# Patient Record
Sex: FEMALE | Race: WHITE | NOT HISPANIC OR LATINO | Employment: OTHER | URBAN - METROPOLITAN AREA
[De-identification: names, ages, dates, MRNs, and addresses within clinical notes are randomized per-mention and may not be internally consistent; named-entity substitution may affect disease eponyms.]

---

## 2017-12-30 ENCOUNTER — LAB REQUISITION (OUTPATIENT)
Dept: LAB | Facility: HOSPITAL | Age: 82
End: 2017-12-30

## 2017-12-30 DIAGNOSIS — Z00.00 ROUTINE GENERAL MEDICAL EXAMINATION AT A HEALTH CARE FACILITY: ICD-10-CM

## 2017-12-30 LAB
HCT VFR BLD AUTO: 27.2 % (ref 35.6–45.5)
HGB BLD-MCNC: 8.3 G/DL (ref 11.9–15.5)

## 2017-12-30 PROCEDURE — 85014 HEMATOCRIT: CPT

## 2017-12-30 PROCEDURE — 85018 HEMOGLOBIN: CPT

## 2018-01-02 ENCOUNTER — HOSPITAL ENCOUNTER (EMERGENCY)
Facility: HOSPITAL | Age: 83
Discharge: HOME | End: 2018-01-03
Attending: EMERGENCY MEDICINE | Admitting: EMERGENCY MEDICINE

## 2018-01-02 ENCOUNTER — APPOINTMENT (OUTPATIENT)
Dept: GENERAL RADIOLOGY | Facility: HOSPITAL | Age: 83
End: 2018-01-02

## 2018-01-02 VITALS
SYSTOLIC BLOOD PRESSURE: 157 MMHG | HEART RATE: 64 BPM | OXYGEN SATURATION: 93 % | WEIGHT: 120 LBS | DIASTOLIC BLOOD PRESSURE: 61 MMHG | RESPIRATION RATE: 18 BRPM | BODY MASS INDEX: 21.26 KG/M2 | HEIGHT: 63 IN | TEMPERATURE: 98.8 F

## 2018-01-02 DIAGNOSIS — I50.9 ACUTE ON CHRONIC CONGESTIVE HEART FAILURE, UNSPECIFIED CONGESTIVE HEART FAILURE TYPE: Primary | ICD-10-CM

## 2018-01-02 DIAGNOSIS — D64.9 CHRONIC ANEMIA: ICD-10-CM

## 2018-01-02 LAB
ALBUMIN SERPL-MCNC: 2.4 G/DL (ref 3.5–5.2)
ALBUMIN/GLOB SERPL: 0.5 G/DL
ALP SERPL-CCNC: 84 U/L (ref 39–117)
ALT SERPL W P-5'-P-CCNC: 5 U/L (ref 1–33)
ANION GAP SERPL CALCULATED.3IONS-SCNC: 10.2 MMOL/L
AST SERPL-CCNC: 15 U/L (ref 1–32)
BASOPHILS # BLD AUTO: 0.02 10*3/MM3 (ref 0–0.2)
BASOPHILS NFR BLD AUTO: 0.3 % (ref 0–1.5)
BILIRUB SERPL-MCNC: 0.2 MG/DL (ref 0.1–1.2)
BUN BLD-MCNC: 26 MG/DL (ref 8–23)
BUN/CREAT SERPL: 16.3 (ref 7–25)
CALCIUM SPEC-SCNC: 8.3 MG/DL (ref 8.2–9.6)
CHLORIDE SERPL-SCNC: 101 MMOL/L (ref 98–107)
CO2 SERPL-SCNC: 26.8 MMOL/L (ref 22–29)
CREAT BLD-MCNC: 1.6 MG/DL (ref 0.57–1)
DEPRECATED RDW RBC AUTO: 58.6 FL (ref 37–54)
EOSINOPHIL # BLD AUTO: 0.2 10*3/MM3 (ref 0–0.7)
EOSINOPHIL NFR BLD AUTO: 2.9 % (ref 0.3–6.2)
ERYTHROCYTE [DISTWIDTH] IN BLOOD BY AUTOMATED COUNT: 16.6 % (ref 11.7–13)
GFR SERPL CREATININE-BSD FRML MDRD: 30 ML/MIN/1.73
GLOBULIN UR ELPH-MCNC: 5.1 GM/DL
GLUCOSE BLD-MCNC: 87 MG/DL (ref 65–99)
HCT VFR BLD AUTO: 27.8 % (ref 35.6–45.5)
HGB BLD-MCNC: 8.4 G/DL (ref 11.9–15.5)
IMM GRANULOCYTES # BLD: 0.02 10*3/MM3 (ref 0–0.03)
IMM GRANULOCYTES NFR BLD: 0.3 % (ref 0–0.5)
LYMPHOCYTES # BLD AUTO: 1.63 10*3/MM3 (ref 0.9–4.8)
LYMPHOCYTES NFR BLD AUTO: 23.8 % (ref 19.6–45.3)
MCH RBC QN AUTO: 29 PG (ref 26.9–32)
MCHC RBC AUTO-ENTMCNC: 30.2 G/DL (ref 32.4–36.3)
MCV RBC AUTO: 95.9 FL (ref 80.5–98.2)
MONOCYTES # BLD AUTO: 1 10*3/MM3 (ref 0.2–1.2)
MONOCYTES NFR BLD AUTO: 14.6 % (ref 5–12)
NEUTROPHILS # BLD AUTO: 3.99 10*3/MM3 (ref 1.9–8.1)
NEUTROPHILS NFR BLD AUTO: 58.1 % (ref 42.7–76)
NT-PROBNP SERPL-MCNC: 2126 PG/ML (ref 0–1800)
PLATELET # BLD AUTO: 387 10*3/MM3 (ref 140–500)
PMV BLD AUTO: 10 FL (ref 6–12)
POTASSIUM BLD-SCNC: 4.5 MMOL/L (ref 3.5–5.2)
PROT SERPL-MCNC: 7.5 G/DL (ref 6–8.5)
RBC # BLD AUTO: 2.9 10*6/MM3 (ref 3.9–5.2)
SODIUM BLD-SCNC: 138 MMOL/L (ref 136–145)
TROPONIN T SERPL-MCNC: <0.01 NG/ML (ref 0–0.03)
WBC NRBC COR # BLD: 6.86 10*3/MM3 (ref 4.5–10.7)

## 2018-01-02 PROCEDURE — 93005 ELECTROCARDIOGRAM TRACING: CPT | Performed by: EMERGENCY MEDICINE

## 2018-01-02 PROCEDURE — 85025 COMPLETE CBC W/AUTO DIFF WBC: CPT | Performed by: EMERGENCY MEDICINE

## 2018-01-02 PROCEDURE — 99284 EMERGENCY DEPT VISIT MOD MDM: CPT

## 2018-01-02 PROCEDURE — 93010 ELECTROCARDIOGRAM REPORT: CPT | Performed by: INTERNAL MEDICINE

## 2018-01-02 PROCEDURE — 84484 ASSAY OF TROPONIN QUANT: CPT | Performed by: EMERGENCY MEDICINE

## 2018-01-02 PROCEDURE — 80053 COMPREHEN METABOLIC PANEL: CPT | Performed by: EMERGENCY MEDICINE

## 2018-01-02 PROCEDURE — 71046 X-RAY EXAM CHEST 2 VIEWS: CPT

## 2018-01-02 PROCEDURE — 83880 ASSAY OF NATRIURETIC PEPTIDE: CPT | Performed by: EMERGENCY MEDICINE

## 2018-01-02 PROCEDURE — 36415 COLL VENOUS BLD VENIPUNCTURE: CPT | Performed by: EMERGENCY MEDICINE

## 2018-01-03 ENCOUNTER — EPISODE CHANGES (OUTPATIENT)
Dept: CASE MANAGEMENT | Facility: OTHER | Age: 83
End: 2018-01-03

## 2018-01-03 NOTE — ED PROVIDER NOTES
EMERGENCY DEPARTMENT ENCOUNTER    CHIEF COMPLAINT  Chief Complaint: Abnormal Lab  History given by: Patient  History limited by: none  Room Number: 19/19  PMD: Ani Bentley MD      HPI:  Pt is a 89 y.o. female who presents to ED from NH due to elevated BNP. EMS states pt was wheezing, but pt denies SOA and chest pain. Pt confirms leg swelling, but is unsure how long that has been going on.     Duration:  Several hours  Onset: gradual  Timing: constant  Location: none  Radiation: none  Quality: elevated BNP  Intensity/Severity: mild  Progression: unchanged  Associated Symptoms: leg swelling  Aggravating Factors: none  Alleviating Factors: none  Previous Episodes: none  Treatment before arrival: none    PAST MEDICAL HISTORY  Active Ambulatory Problems     Diagnosis Date Noted   • Iron deficiency anemia due to chronic blood loss 06/10/2016   • CKD (chronic kidney disease) 06/10/2016   • Rectal ulcer 06/10/2016   • Anemia of chronic renal failure, stage 2 (mild) 07/21/2016     Resolved Ambulatory Problems     Diagnosis Date Noted   • No Resolved Ambulatory Problems     Past Medical History:   Diagnosis Date   • Anemia    • Anxiety    • Chronic kidney disease    • Dementia    • Hypertension    • Renal disorder        PAST SURGICAL HISTORY  History reviewed. No pertinent surgical history.    FAMILY HISTORY  History reviewed. No pertinent family history.    SOCIAL HISTORY  Social History     Social History   • Marital status:      Spouse name: N/A   • Number of children: N/A   • Years of education: N/A     Occupational History   • Not on file.     Social History Main Topics   • Smoking status: Never Smoker   • Smokeless tobacco: Never Used   • Alcohol use Not on file   • Drug use: Not on file   • Sexual activity: Not on file     Other Topics Concern   • Not on file     Social History Narrative   • No narrative on file       ALLERGIES  Niacin    REVIEW OF SYSTEMS  Review of Systems   Constitutional:  Negative for fever.   HENT: Negative for sore throat.    Eyes: Negative.    Respiratory: Negative for cough and shortness of breath.    Cardiovascular: Positive for leg swelling. Negative for chest pain.   Gastrointestinal: Negative for abdominal pain, diarrhea and vomiting.   Genitourinary: Negative for dysuria.   Musculoskeletal: Negative for neck pain.   Skin: Negative for rash.   Allergic/Immunologic: Negative.    Neurological: Negative for weakness, numbness and headaches.   Hematological: Negative.    Psychiatric/Behavioral: Negative.    All other systems reviewed and are negative.      PHYSICAL EXAM  ED Triage Vitals   Temp Heart Rate Resp BP SpO2   01/02/18 1834 01/02/18 1800 01/02/18 1800 01/02/18 1800 01/02/18 1800   98.7 °F (37.1 °C) 70 16 185/70 93 %      Temp src Heart Rate Source Patient Position BP Location FiO2 (%)   01/02/18 1834 01/02/18 2048 01/02/18 2048 01/02/18 2048 --   Tympanic Monitor Lying Left arm        Physical Exam   Constitutional: She is oriented to person, place, and time and well-developed, well-nourished, and in no distress. No distress.   HENT:   Head: Normocephalic and atraumatic.   Eyes: EOM are normal. Pupils are equal, round, and reactive to light.   Neck: Normal range of motion. Neck supple.   Cardiovascular: Normal rate, regular rhythm and normal heart sounds.    Pulmonary/Chest: Effort normal and breath sounds normal. No respiratory distress.   Abdominal: Soft. There is no tenderness. There is no rebound and no guarding.   Musculoskeletal: Normal range of motion. She exhibits edema (2+ pedal).   Neurological: She is alert and oriented to person, place, and time. She has normal sensation and normal strength.   Skin: Skin is warm and dry. No rash noted.   Psychiatric: Mood and affect normal.   Nursing note and vitals reviewed.      LAB RESULTS  Lab Results (last 24 hours)     Procedure Component Value Units Date/Time    CBC & Differential [[id number]] Collected:  01/02/18 1912     Specimen:  Blood Updated:  01/02/18 2007    Narrative:       The following orders were created for panel order CBC & Differential.  Procedure                               Abnormality         Status                     ---------                               -----------         ------                     CBC Auto Differential[[id number]]        Abnormal            Final result                 Please view results for these tests on the individual orders.    Comprehensive Metabolic Panel [[id number]]  (Abnormal) Collected:  01/02/18 1912    Specimen:  Blood Updated:  01/02/18 2003     Glucose 87 mg/dL      BUN 26 (H) mg/dL      Creatinine 1.60 (H) mg/dL      Sodium 138 mmol/L      Potassium 4.5 mmol/L      Chloride 101 mmol/L      CO2 26.8 mmol/L      Calcium 8.3 mg/dL      Total Protein 7.5 g/dL      Albumin 2.40 (L) g/dL      ALT (SGPT) 5 U/L      AST (SGOT) 15 U/L      Alkaline Phosphatase 84 U/L      Total Bilirubin 0.2 mg/dL      eGFR Non African Amer 30 (L) mL/min/1.73      Globulin 5.1 gm/dL      A/G Ratio 0.5 g/dL      BUN/Creatinine Ratio 16.3     Anion Gap 10.2 mmol/L     Narrative:       The MDRD GFR formula is only valid for adults with stable renal function between ages 18 and 70.    BNP [[id number]]  (Abnormal) Collected:  01/02/18 1912    Specimen:  Blood Updated:  01/02/18 1949     proBNP 2126.0 (H) pg/mL     Narrative:       Among patients with dyspnea, NT-proBNP is highly sensitive for the detection of acute congestive heart failure. In addition NT-proBNP of <300 pg/ml effectively rules out acute congestive heart failure with 99% negative predictive value.    Troponin [[id number]]  (Normal) Collected:  01/02/18 1912    Specimen:  Blood Updated:  01/02/18 1952     Troponin T <0.010 ng/mL     Narrative:       Troponin T Reference Ranges:  Less than 0.03 ng/mL:    Negative for AMI  0.03 to 0.09 ng/mL:      Indeterminant for AMI  Greater than 0.09 ng/mL: Positive for AMI    CBC Auto Differential  [[id number]]  (Abnormal) Collected:  01/02/18 1912    Specimen:  Blood Updated:  01/02/18 2007     WBC 6.86 10*3/mm3      RBC 2.90 (L) 10*6/mm3      Hemoglobin 8.4 (L) g/dL      Hematocrit 27.8 (L) %      MCV 95.9 fL      MCH 29.0 pg      MCHC 30.2 (L) g/dL      RDW 16.6 (H) %      RDW-SD 58.6 (H) fl      MPV 10.0 fL      Platelets 387 10*3/mm3      Neutrophil % 58.1 %      Lymphocyte % 23.8 %      Monocyte % 14.6 (H) %      Eosinophil % 2.9 %      Basophil % 0.3 %      Immature Grans % 0.3 %      Neutrophils, Absolute 3.99 10*3/mm3      Lymphocytes, Absolute 1.63 10*3/mm3      Monocytes, Absolute 1.00 10*3/mm3      Eosinophils, Absolute 0.20 10*3/mm3      Basophils, Absolute 0.02 10*3/mm3      Immature Grans, Absolute 0.02 10*3/mm3           I ordered the above labs and reviewed the results    RADIOLOGY  XR Chest 2 View   Final Result   Frontal projection of the chest demonstrates some pulmonary  vascular engorgement along with potentially mild interstitial pulmonary  edema. Areas of airspace opacification are seen within the left base  likely representative of foci of atelectasis and less likely infiltrate.  Lung volumes are relatively low. Cardiomediastinal silhouette  mild-to-moderately enlarged.         I ordered the above noted radiological studies. Interpreted by radiologist. Reviewed by me in PACS.       PROCEDURES  Procedures  EKG           EKG time: 2133  Rhythm/Rate: Copmplete Heart Block (artifact) 66  P waves and SD: irregular P waves, varying YANNA  QRS, axis: left axis deviation   ST and T waves: nonspecific T wave changes      Interpreted Contemporaneously by me, independently viewed  Changed compared to prior 8/22/10    EKG           EKG time: 2223  Rhythm/Rate: SR 63  P waves and SD: nml  QRS, axis: left axis deviation, small Q waves  ST and T waves: nonspecific T wave changes     Interpreted Contemporaneously by me, independently viewed  Changed compared to prior 2133     PROGRESS AND CONSULTS  ED  Course   Value Comment By Time   Hemoglobin: (!) 8.4 8.8 in 6/2017 Chino Wasserman MD 01/02 2109   Creatinine: (!) 1.60 1.41 ten months ago Chino Wasserman MD 01/02 2259     1840  CXR and labs ordered for further evaluation.     2235  Pt rechecked and resting comfortably. Discussed negative results of labs, EKGs, and CXR and plan to discharge due to lack of acute findings and unremarkable exam. Informed pt of plan to increase Lasix. Pt understands and agrees with plan, all questions addressed.   O2 sats: 92% on room air.       MEDICAL DECISION MAKING  Results were reviewed/discussed with the patient and they were also made aware of online access. Pt also made aware that some labs, such as cultures, will not be resulted during ER visit and follow up with PMD is necessary.     MDM  Number of Diagnoses or Management Options  Acute on chronic congestive heart failure, unspecified congestive heart failure type:   Chronic anemia:   Diagnosis management comments: Patient was sent to the ER from the nursing home with reports of an elevated BNP.  The patient had no complaints other than leg swelling.  Chest x-ray showed possible mildly increased vascular congestion.  Her proBNP was somewhat elevated.  Patient has chronic renal insufficiency.  Patient was breathing comfortably  Her room air O2 sats were 92%.  Patient does not require admission at this time.  Patient will be advised to increase her Lasix dose for the next several days.         Amount and/or Complexity of Data Reviewed  Clinical lab tests: ordered and reviewed (Troponin - negative  proBNP - 2126.0)  Tests in the radiology section of CPT®: ordered and reviewed (CXR - Frontal projection of the chest demonstrates some pulmonary  vascular engorgement along with potentially mild interstitial pulmonary  edema. Areas of airspace opacification are seen within the left base  likely representative of foci of atelectasis and less likely infiltrate.  Lung volumes are  relatively low. Cardiomediastinal silhouette  mild-to-moderately enlarged. )  Tests in the medicine section of CPT®: ordered and reviewed (See note)  Decide to obtain previous medical records or to obtain history from someone other than the patient: yes  Review and summarize past medical records: yes (12/13 - 12/16/17 - Admitted to [facility] for acute on chronic renal failure, UTI, and recent fall.   Creatinine 1.4 at discharge. )           DIAGNOSIS  Final diagnoses:   None       DISPOSITION  DISCHARGE    Patient discharged in stable condition.    Reviewed implications of results, diagnosis, meds, responsibility to follow up, warning signs and symptoms of possible worsening, potential complications and reasons to return to ER.    Patient/Family voiced understanding of above instructions.    Discussed plan for discharge, as there is no emergent indication for admission.  Pt/family is agreeable and understands need for follow up and repeat testing.  Pt is aware that discharge does not mean that nothing is wrong but it indicates no emergency is present that requires admission and they must continue care with follow-up as given below or physician of their choice.     FOLLOW-UP  No follow-up provider specified.       Medication List      Notice     No changes were made to your prescriptions during this visit.          Latest Documented Vital Signs:  As of 10:34 PM  BP- 169/68 HR- 62 Temp- 97.9 °F (36.6 °C) (Oral) O2 sat- 92%    --  Documentation assistance provided by alma Murray for Dr. Wasserman.  Information recorded by the scribe was done at my direction and has been verified and validated by me.              Casi Murray  01/02/18 9309       Chino Wasserman MD  01/02/18 2631

## 2018-01-03 NOTE — ED NOTES
"[facility] called for transport.  ETA \"within the hour\".     Swapna Trejo RN  01/02/18 0466    "

## 2018-01-03 NOTE — DISCHARGE INSTRUCTIONS
Take an extra 20 mg of Lasix daily for the next 4 days.  Return to emergency department for shortness of breath, chest pain, increased leg swelling, or other concern.  Follow-up with her primary care doctor in the next several days.

## 2018-01-22 ENCOUNTER — APPOINTMENT (OUTPATIENT)
Dept: GENERAL RADIOLOGY | Facility: HOSPITAL | Age: 83
End: 2018-01-22

## 2018-01-22 ENCOUNTER — APPOINTMENT (OUTPATIENT)
Dept: CT IMAGING | Facility: HOSPITAL | Age: 83
End: 2018-01-22

## 2018-01-22 ENCOUNTER — HOSPITAL ENCOUNTER (EMERGENCY)
Facility: HOSPITAL | Age: 83
Discharge: HOME | End: 2018-01-23
Attending: EMERGENCY MEDICINE | Admitting: EMERGENCY MEDICINE

## 2018-01-22 VITALS
OXYGEN SATURATION: 93 % | HEIGHT: 63 IN | DIASTOLIC BLOOD PRESSURE: 78 MMHG | BODY MASS INDEX: 19.49 KG/M2 | RESPIRATION RATE: 17 BRPM | WEIGHT: 110 LBS | TEMPERATURE: 97.9 F | HEART RATE: 72 BPM | SYSTOLIC BLOOD PRESSURE: 171 MMHG
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
## 2018-01-22 DIAGNOSIS — S06.0X0A CONCUSSION WITHOUT LOSS OF CONSCIOUSNESS, INITIAL ENCOUNTER: ICD-10-CM

## 2018-01-22 DIAGNOSIS — S16.1XXA STRAIN OF NECK MUSCLE, INITIAL ENCOUNTER: ICD-10-CM

## 2018-01-22 DIAGNOSIS — S50.02XA CONTUSION OF LEFT ELBOW, INITIAL ENCOUNTER: ICD-10-CM

## 2018-01-22 DIAGNOSIS — S00.83XA CONTUSION OF FACE, INITIAL ENCOUNTER: Primary | ICD-10-CM

## 2018-01-22 PROCEDURE — 72125 CT NECK SPINE W/O DYE: CPT

## 2018-01-22 PROCEDURE — 73070 X-RAY EXAM OF ELBOW: CPT

## 2018-01-22 PROCEDURE — 70450 CT HEAD/BRAIN W/O DYE: CPT

## 2018-01-22 PROCEDURE — 70486 CT MAXILLOFACIAL W/O DYE: CPT

## 2018-01-22 PROCEDURE — 99284 EMERGENCY DEPT VISIT MOD MDM: CPT

## 2018-01-22 RX ORDER — DONEPEZIL HYDROCHLORIDE 10 MG/1
10 TABLET, FILM COATED ORAL NIGHTLY
COMMUNITY
End: 2018-03-06 | Stop reason: HOSPADM

## 2018-01-22 RX ORDER — ESCITALOPRAM OXALATE 10 MG/1
10 TABLET ORAL DAILY
COMMUNITY
End: 2018-03-06 | Stop reason: HOSPADM

## 2018-01-22 RX ORDER — PANTOPRAZOLE SODIUM 20 MG/1
20 TABLET, DELAYED RELEASE ORAL DAILY
COMMUNITY
End: 2018-03-06 | Stop reason: HOSPADM

## 2018-01-22 RX ORDER — CHOLESTYRAMINE 4 G/9G
1 POWDER, FOR SUSPENSION ORAL
COMMUNITY
End: 2018-03-06 | Stop reason: HOSPADM

## 2018-01-22 RX ORDER — SULFAMETHOXAZOLE AND TRIMETHOPRIM 400; 80 MG/1; MG/1
1 TABLET ORAL 2 TIMES DAILY
COMMUNITY
End: 2018-03-06 | Stop reason: HOSPADM

## 2018-01-22 RX ORDER — VANCOMYCIN HYDROCHLORIDE 125 MG/1
125 CAPSULE ORAL 4 TIMES DAILY
COMMUNITY

## 2018-01-22 RX ORDER — FUROSEMIDE 20 MG/1
20 TABLET ORAL 2 TIMES DAILY
COMMUNITY
End: 2018-03-06 | Stop reason: HOSPADM

## 2018-01-22 RX ORDER — IPRATROPIUM BROMIDE AND ALBUTEROL SULFATE 2.5; .5 MG/3ML; MG/3ML
3 SOLUTION RESPIRATORY (INHALATION) EVERY 4 HOURS PRN
COMMUNITY

## 2018-01-22 RX ORDER — ACETAMINOPHEN 325 MG/1
650 TABLET ORAL EVERY 6 HOURS PRN
COMMUNITY

## 2018-01-22 RX ORDER — FERROUS SULFATE 325(65) MG
325 TABLET ORAL
COMMUNITY

## 2018-01-22 RX ORDER — TAMSULOSIN HYDROCHLORIDE 0.4 MG/1
1 CAPSULE ORAL NIGHTLY
COMMUNITY

## 2018-01-22 RX ORDER — PHENOL 1.4 %
600 AEROSOL, SPRAY (ML) MUCOUS MEMBRANE DAILY
COMMUNITY

## 2018-01-22 NOTE — ED TRIAGE NOTES
Pt was brought in by ems from [facility]  For unwitnessed falls. Pt has swelling left cheek and left forearm pain. Unknown loc. Denies pain. Bruising to left elbow and  Swelling to left cheek

## 2018-01-22 NOTE — ED PROVIDER NOTES
" EMERGENCY DEPARTMENT ENCOUNTER    CHIEF COMPLAINT  Chief Complaint: Fall  History given by: Patient  History limited by: Dementia  Room Number: 46/46  PMD: Ani Bentley MD      HPI:  Pt with h/o dementia presents to the ED c/o head injury s/p mechanical fall sustained earlier today in which pt slipped on a wet surface while pt was walking to the restroom. Pt denies LOC, abdominal pain, and chest pain, but reports that she has had mild neck pain. There are no other complaints at this time.     Pain Location: Head  Radiation: None  Quality: \"aching\"  Intensity/Severity: Moderate  Duration: Fall occurred earlier today  Onset quality: Fall was abrupt; pain was gradual  Timing: Pain is intermittent  Progression: Pain waxes and wanes  Aggravating Factors: Nothing  Alleviating Factors: Nothing  Previous Episodes: Unknown  Treatment before arrival: C-Collar applied to the neck  Associated Symptoms: Neck pain       PAST MEDICAL HISTORY  Active Ambulatory Problems     Diagnosis Date Noted   • Iron deficiency anemia due to chronic blood loss 06/10/2016   • CKD (chronic kidney disease) 06/10/2016   • Rectal ulcer 06/10/2016   • Anemia of chronic renal failure, stage 2 (mild) 07/21/2016     Resolved Ambulatory Problems     Diagnosis Date Noted   • No Resolved Ambulatory Problems     Past Medical History:   Diagnosis Date   • Anemia    • Anxiety    • Chronic kidney disease    • Coronary artery disease    • Dementia    • GERD (gastroesophageal reflux disease)    • Hypertension    • Renal disorder          PAST SURGICAL HISTORY  History reviewed. No pertinent surgical history.      FAMILY HISTORY  History reviewed. No pertinent family history.      SOCIAL HISTORY  Social History     Social History   • Marital status:      Spouse name: N/A   • Number of children: N/A   • Years of education: N/A     Occupational History   • Not on file.     Social History Main Topics   • Smoking status: Never Smoker   • Smokeless " tobacco: Never Used   • Alcohol use No   • Drug use: No   • Sexual activity: Not on file     Other Topics Concern   • Not on file     Social History Narrative   • No narrative on file         ALLERGIES  Niacin        REVIEW OF SYSTEMS  Review of Systems   Unable to perform ROS: Dementia   Cardiovascular: Negative for chest pain.   Gastrointestinal: Negative for abdominal pain.   Musculoskeletal: Positive for neck pain.        Head injury   Neurological: Negative for syncope.            PHYSICAL EXAM  ED Triage Vitals   Temp Heart Rate Resp BP SpO2   01/22/18 1519 01/22/18 1509 01/22/18 1509 01/22/18 1509 01/22/18 1509   97.9 °F (36.6 °C) 111 16 195/88 96 %      Temp src Heart Rate Source Patient Position BP Location FiO2 (%)   01/22/18 1519 01/22/18 1509 01/22/18 1846 01/22/18 1846 --   Oral Monitor Lying Right arm        Physical Exam   Constitutional: No distress.   HENT:   Right Ear: Tympanic membrane normal.   Left Ear: Tympanic membrane normal.   Mouth/Throat: Mucous membranes are normal.   There is swelling to the left periorbital area and left cheek.      Eyes: EOM are normal. Pupils are equal, round, and reactive to light.   Neck: Normal range of motion. Neck supple.   Cardiovascular: Normal rate, regular rhythm and normal heart sounds.    Pulmonary/Chest: Effort normal and breath sounds normal. No respiratory distress. She has no decreased breath sounds. She has no wheezes. She has no rhonchi. She has no rales. She exhibits no tenderness.   Abdominal: Soft. There is no tenderness. There is no rebound and no guarding.   Musculoskeletal: She exhibits tenderness (to the left elbow (however, pt is able to extend it fully)).   C-Spine, T-Spine, and L-Spine are nontender. Right elbow is nontender.    Neurological: She is alert. She has normal sensation.   Pt answers some questions appropriately (pt has dementia).    Skin: Skin is warm.   There is a small skin tear to the lateral aspect of the left leg.     Psychiatric: Mood and affect normal.   Nursing note and vitals reviewed.          RADIOLOGY  CT Cervical Spine Without Contrast   Preliminary Result   No convincing fracture is identified. Extensive multilevel   degenerative disease involving the cervical spine is noted. There is   widening of the interspinous space, however at C6-7. This is age   indeterminate and may be a function of prior trauma. Acute injury cannot   be excluded. A calcific focus medial to the superior articulating facet   of C7 on the left is appreciated. Donor site is not identified. This may   represent ligamentum flavum calcification or potentially be related to a   remote injury. Further evaluation could be performed with a MRI   examination of the cervical spine. The above information was called to   and discussed with Dr. Zambrano.       CHECK CHECK           Radiation dose reduction techniques were utilized, including automated   exposure control and exposure modulation based on body size.      Interpreted by radiologist. Independently viewed by me.         CT Facial Bones Without Contrast   Preliminary Result   No convincing fracture is identified. Extensive multilevel   degenerative disease involving the cervical spine is noted. There is   widening of the interspinous space, however at C6-7. This is age   indeterminate and may be a function of prior trauma. Acute injury cannot   be excluded. A calcific focus medial to the superior articulating facet   of C7 on the left is appreciated. Donor site is not identified. This may   represent ligamentum flavum calcification or potentially be related to a   remote injury. Further evaluation could be performed with a MRI   examination of the cervical spine. The above information was called to   and discussed with Dr. Zambrano.       CHECK CHECK           Radiation dose reduction techniques were utilized, including automated   exposure control and exposure modulation based on body size.       Interpreted by radiologist. Independently viewed by me.         CT Head Without Contrast   Preliminary Result   No evidence of fracture or of intracranial hemorrhage.       Radiation dose reduction techniques were utilized, including automated   exposure control and exposure modulation based on body size.        Interpreted by radiologist. Independently viewed by me.         XR Elbow 2 View Left   Final Result       FINDINGS: No joint effusion, fracture or dislocation. There is a  suggestion of soft tissue swelling noted about the lateral humeral  epicondyle. No soft tissue gas nor radiopaque foreign body seen. Joint  width's preserved. The remainder is unremarkable.     CONCLUSION: No acute osseous abnormality.    Interpreted by radiologist. Independently viewed by me.                Ordered the above noted radiological studies. Reviewed by me in PACS.       PROCEDURES  Procedures        PROGRESS AND CONSULTS  ED Course     6:57 PM:  Pt's CT Head, CT C-Spine, left elbow X-Ray, and CT Facial Bones are all negative acute. Pt will be discharged.     Pt's C-Collar has been removed prior to pt's discharge from the ER.         MEDICAL DECISION MAKING      MDM  Number of Diagnoses or Management Options     Amount and/or Complexity of Data Reviewed  Tests in the radiology section of CPT®: ordered and reviewed (CT Head: No evidence of fracture or of intracranial hemorrhage.)    Patient Progress  Patient progress: stable             DIAGNOSIS  Final diagnoses:   Contusion of face, initial encounter   Strain of neck muscle, initial encounter   Contusion of left elbow, initial encounter   Concussion without loss of consciousness, initial encounter   Fall, initial encounter         DISPOSITION  Pt discharged.    DISCHARGE    Patient discharged in stable condition.    Reviewed implications of results, diagnosis, meds, responsibility to follow up, warning signs and symptoms of possible worsening, potential complications and  reasons to return to ER.    Patient/Family voiced understanding of above instructions.    Discussed plan for discharge, as there is no emergent indication for admission.  Pt/family is agreeable and understands need for follow up and repeat testing.  Pt is aware that discharge does not mean that nothing is wrong but it indicates no emergency is present that requires admission and they must continue care with follow-up as given below or physician of their choice.     FOLLOW-UP  Ani Bentley MD  [address] [address]  [phone]    Schedule an appointment as soon as possible for a visit           Medication List      Changed          escitalopram 10 MG tablet   Commonly known as:  LEXAPRO   What changed:  Another medication with the same name was removed. Continue   taking this medication, and follow the directions you see here.       furosemide 20 MG tablet   Commonly known as:  LASIX   What changed:  Another medication with the same name was removed. Continue   taking this medication, and follow the directions you see here.       pantoprazole 20 MG EC tablet   Commonly known as:  PROTONIX   What changed:  Another medication with the same name was removed. Continue   taking this medication, and follow the directions you see here.                       Latest Documented Vital Signs:  As of 6:51 PM  BP- 171/78 HR- 76 Temp- 97.9 °F (36.6 °C) (Oral) O2 sat- 94%        --  Documentation assistance provided by alma Dumont for Dr. Lima MD.  Information recorded by the scribe was done at my direction and has been verified and validated by me.       Ruma Dumont  01/22/18 0784       Siva Amato MD  01/22/18 7306

## 2018-01-23 NOTE — ED NOTES
Spoke with Rachael about sending patient back to [facility] via [facility].       Gatito Reyes RN  01/22/18 1924

## 2018-01-23 NOTE — ED NOTES
RN spoke with Lakisha HALL at [facility] about sending the patient back to the facility.           Gatito Reyes RN  01/22/18 1926

## 2018-01-23 NOTE — DISCHARGE INSTRUCTIONS
Continue home medications and ice your face and elbow as needed.  Keep abrasions clean and dry.  Please return to the ED if symptoms worsen with vomiting or increasing pain.

## 2018-03-01 ENCOUNTER — HOSPITAL ENCOUNTER (INPATIENT)
Facility: HOSPITAL | Age: 83
LOS: 4 days | Discharge: NURSING, REHAB OR LONG-TERM CARE | End: 2018-03-06
Attending: EMERGENCY MEDICINE | Admitting: HOSPITALIST

## 2018-03-01 DIAGNOSIS — N17.9 ACUTE KIDNEY INJURY (HCC): ICD-10-CM

## 2018-03-01 DIAGNOSIS — N39.0 URINARY TRACT INFECTION IN ELDERLY PATIENT: Primary | ICD-10-CM

## 2018-03-01 LAB
BASOPHILS # BLD AUTO: 0.02 10*3/MM3 (ref 0–0.2)
BASOPHILS NFR BLD AUTO: 0.3 % (ref 0–1.5)
DEPRECATED RDW RBC AUTO: 59.2 FL (ref 37–54)
EOSINOPHIL # BLD AUTO: 0.26 10*3/MM3 (ref 0–0.7)
EOSINOPHIL NFR BLD AUTO: 3.6 % (ref 0.3–6.2)
ERYTHROCYTE [DISTWIDTH] IN BLOOD BY AUTOMATED COUNT: 16 % (ref 11.7–13)
HCT VFR BLD AUTO: 32.3 % (ref 35.6–45.5)
HGB BLD-MCNC: 9.8 G/DL (ref 11.9–15.5)
IMM GRANULOCYTES # BLD: 0 10*3/MM3 (ref 0–0.03)
IMM GRANULOCYTES NFR BLD: 0 % (ref 0–0.5)
LYMPHOCYTES # BLD AUTO: 2.03 10*3/MM3 (ref 0.9–4.8)
LYMPHOCYTES NFR BLD AUTO: 27.7 % (ref 19.6–45.3)
MAGNESIUM SERPL-MCNC: 2.1 MG/DL (ref 1.7–2.3)
MCH RBC QN AUTO: 30.5 PG (ref 26.9–32)
MCHC RBC AUTO-ENTMCNC: 30.3 G/DL (ref 32.4–36.3)
MCV RBC AUTO: 100.6 FL (ref 80.5–98.2)
MONOCYTES # BLD AUTO: 0.82 10*3/MM3 (ref 0.2–1.2)
MONOCYTES NFR BLD AUTO: 11.2 % (ref 5–12)
NEUTROPHILS # BLD AUTO: 4.19 10*3/MM3 (ref 1.9–8.1)
NEUTROPHILS NFR BLD AUTO: 57.2 % (ref 42.7–76)
PLATELET # BLD AUTO: 297 10*3/MM3 (ref 140–500)
PMV BLD AUTO: 10.5 FL (ref 6–12)
RBC # BLD AUTO: 3.21 10*6/MM3 (ref 3.9–5.2)
WBC NRBC COR # BLD: 7.32 10*3/MM3 (ref 4.5–10.7)

## 2018-03-01 PROCEDURE — 80053 COMPREHEN METABOLIC PANEL: CPT | Performed by: PHYSICIAN ASSISTANT

## 2018-03-01 PROCEDURE — 87086 URINE CULTURE/COLONY COUNT: CPT | Performed by: PHYSICIAN ASSISTANT

## 2018-03-01 PROCEDURE — 94799 UNLISTED PULMONARY SVC/PX: CPT

## 2018-03-01 PROCEDURE — 81001 URINALYSIS AUTO W/SCOPE: CPT | Performed by: PHYSICIAN ASSISTANT

## 2018-03-01 PROCEDURE — 99285 EMERGENCY DEPT VISIT HI MDM: CPT

## 2018-03-01 PROCEDURE — 94640 AIRWAY INHALATION TREATMENT: CPT

## 2018-03-01 PROCEDURE — 83735 ASSAY OF MAGNESIUM: CPT | Performed by: PHYSICIAN ASSISTANT

## 2018-03-01 PROCEDURE — 85025 COMPLETE CBC W/AUTO DIFF WBC: CPT | Performed by: PHYSICIAN ASSISTANT

## 2018-03-01 PROCEDURE — 87186 SC STD MICRODIL/AGAR DIL: CPT | Performed by: PHYSICIAN ASSISTANT

## 2018-03-01 RX ORDER — IPRATROPIUM BROMIDE AND ALBUTEROL SULFATE 2.5; .5 MG/3ML; MG/3ML
3 SOLUTION RESPIRATORY (INHALATION) ONCE
Status: COMPLETED | OUTPATIENT
Start: 2018-03-01 | End: 2018-03-01

## 2018-03-01 RX ORDER — SODIUM CHLORIDE 0.9 % (FLUSH) 0.9 %
10 SYRINGE (ML) INJECTION AS NEEDED
Status: DISCONTINUED | OUTPATIENT
Start: 2018-03-01 | End: 2018-03-06 | Stop reason: HOSPADM

## 2018-03-01 RX ADMIN — IPRATROPIUM BROMIDE AND ALBUTEROL SULFATE 3 ML: .5; 3 SOLUTION RESPIRATORY (INHALATION) at 23:43

## 2018-03-01 RX ADMIN — SODIUM CHLORIDE 500 ML: 9 INJECTION, SOLUTION INTRAVENOUS at 22:55

## 2018-03-02 PROBLEM — N39.0 URINARY TRACT INFECTION IN ELDERLY PATIENT: Status: ACTIVE | Noted: 2018-03-02

## 2018-03-02 PROBLEM — F03.90 DEMENTIA (HCC): Status: ACTIVE | Noted: 2018-03-02

## 2018-03-02 PROBLEM — N18.30 CKD (CHRONIC KIDNEY DISEASE), STAGE III (HCC): Status: ACTIVE | Noted: 2018-03-02

## 2018-03-02 PROBLEM — D53.9 MACROCYTIC ANEMIA: Status: ACTIVE | Noted: 2018-03-02

## 2018-03-02 PROBLEM — E86.0 DEHYDRATION: Status: ACTIVE | Noted: 2018-03-02

## 2018-03-02 PROBLEM — D64.89 ANEMIA DUE TO MULTIPLE MECHANISMS: Status: ACTIVE | Noted: 2018-03-02

## 2018-03-02 PROBLEM — K52.9 CHRONIC DIARRHEA: Status: ACTIVE | Noted: 2018-03-02

## 2018-03-02 LAB
ALBUMIN SERPL-MCNC: 2.7 G/DL (ref 3.5–5.2)
ALBUMIN/GLOB SERPL: 0.6 G/DL
ALP SERPL-CCNC: 110 U/L (ref 39–117)
ALT SERPL W P-5'-P-CCNC: 14 U/L (ref 1–33)
ANION GAP SERPL CALCULATED.3IONS-SCNC: 8.7 MMOL/L
ANION GAP SERPL CALCULATED.3IONS-SCNC: 8.9 MMOL/L
ANION GAP SERPL CALCULATED.3IONS-SCNC: 9.6 MMOL/L
AST SERPL-CCNC: 17 U/L (ref 1–32)
BACTERIA UR QL AUTO: ABNORMAL /HPF
BILIRUB SERPL-MCNC: <0.2 MG/DL (ref 0.1–1.2)
BILIRUB UR QL STRIP: NEGATIVE
BUN BLD-MCNC: 47 MG/DL (ref 8–23)
BUN BLD-MCNC: 50 MG/DL (ref 8–23)
BUN BLD-MCNC: 57 MG/DL (ref 8–23)
BUN/CREAT SERPL: 32.6 (ref 7–25)
BUN/CREAT SERPL: 34.8 (ref 7–25)
BUN/CREAT SERPL: 35 (ref 7–25)
C DIFF TOX GENS STL QL NAA+PROBE: POSITIVE
CALCIUM SPEC-SCNC: 8.5 MG/DL (ref 8.2–9.6)
CALCIUM SPEC-SCNC: 8.6 MG/DL (ref 8.2–9.6)
CALCIUM SPEC-SCNC: 8.6 MG/DL (ref 8.2–9.6)
CHLORIDE SERPL-SCNC: 106 MMOL/L (ref 98–107)
CHLORIDE SERPL-SCNC: 107 MMOL/L (ref 98–107)
CHLORIDE SERPL-SCNC: 107 MMOL/L (ref 98–107)
CLARITY UR: ABNORMAL
CO2 SERPL-SCNC: 24.1 MMOL/L (ref 22–29)
CO2 SERPL-SCNC: 24.4 MMOL/L (ref 22–29)
CO2 SERPL-SCNC: 26.3 MMOL/L (ref 22–29)
COLOR UR: ABNORMAL
CREAT BLD-MCNC: 1.43 MG/DL (ref 0.57–1)
CREAT BLD-MCNC: 1.44 MG/DL (ref 0.57–1)
CREAT BLD-MCNC: 1.64 MG/DL (ref 0.57–1)
DEPRECATED RDW RBC AUTO: 57.9 FL (ref 37–54)
ERYTHROCYTE [DISTWIDTH] IN BLOOD BY AUTOMATED COUNT: 16 % (ref 11.7–13)
FOLATE SERPL-MCNC: 4.02 NG/ML (ref 4.78–24.2)
GFR SERPL CREATININE-BSD FRML MDRD: 29 ML/MIN/1.73
GFR SERPL CREATININE-BSD FRML MDRD: 34 ML/MIN/1.73
GFR SERPL CREATININE-BSD FRML MDRD: 34 ML/MIN/1.73
GLOBULIN UR ELPH-MCNC: 4.3 GM/DL
GLUCOSE BLD-MCNC: 92 MG/DL (ref 65–99)
GLUCOSE BLD-MCNC: 93 MG/DL (ref 65–99)
GLUCOSE BLD-MCNC: 95 MG/DL (ref 65–99)
GLUCOSE UR STRIP-MCNC: NEGATIVE MG/DL
HCT VFR BLD AUTO: 31.8 % (ref 35.6–45.5)
HGB BLD-MCNC: 9.5 G/DL (ref 11.9–15.5)
HGB UR QL STRIP.AUTO: ABNORMAL
HYALINE CASTS UR QL AUTO: ABNORMAL /LPF
IRON 24H UR-MRATE: 25 MCG/DL (ref 37–145)
IRON SATN MFR SERPL: 11 % (ref 20–50)
KETONES UR QL STRIP: ABNORMAL
LACTOFERRIN STL QL LA: POSITIVE
LEUKOCYTE ESTERASE UR QL STRIP.AUTO: ABNORMAL
MAGNESIUM SERPL-MCNC: 2.1 MG/DL (ref 1.7–2.3)
MCH RBC QN AUTO: 29.7 PG (ref 26.9–32)
MCHC RBC AUTO-ENTMCNC: 29.9 G/DL (ref 32.4–36.3)
MCV RBC AUTO: 99.4 FL (ref 80.5–98.2)
NITRITE UR QL STRIP: POSITIVE
PH UR STRIP.AUTO: 6 [PH] (ref 5–8)
PHOSPHATE SERPL-MCNC: 2.7 MG/DL (ref 2.5–4.5)
PLATELET # BLD AUTO: 271 10*3/MM3 (ref 140–500)
PMV BLD AUTO: 10.2 FL (ref 6–12)
POTASSIUM BLD-SCNC: 4.7 MMOL/L (ref 3.5–5.2)
POTASSIUM BLD-SCNC: 4.8 MMOL/L (ref 3.5–5.2)
POTASSIUM BLD-SCNC: 4.8 MMOL/L (ref 3.5–5.2)
PROT SERPL-MCNC: 7 G/DL (ref 6–8.5)
PROT UR QL STRIP: ABNORMAL
RBC # BLD AUTO: 3.2 10*6/MM3 (ref 3.9–5.2)
RBC # UR: ABNORMAL /HPF
REF LAB TEST METHOD: ABNORMAL
RETICS/RBC NFR AUTO: 1.12 % (ref 0.5–1.5)
SODIUM BLD-SCNC: 140 MMOL/L (ref 136–145)
SODIUM BLD-SCNC: 141 MMOL/L (ref 136–145)
SODIUM BLD-SCNC: 141 MMOL/L (ref 136–145)
SP GR UR STRIP: 1.01 (ref 1–1.03)
SQUAMOUS #/AREA URNS HPF: ABNORMAL /HPF
TIBC SERPL-MCNC: 232 MCG/DL (ref 298–536)
TRANSFERRIN SERPL-MCNC: 156 MG/DL (ref 200–360)
TSH SERPL DL<=0.05 MIU/L-ACNC: 2.19 MIU/ML (ref 0.27–4.2)
UROBILINOGEN UR QL STRIP: ABNORMAL
VIT B12 BLD-MCNC: 404 PG/ML (ref 211–946)
WBC NRBC COR # BLD: 7.55 10*3/MM3 (ref 4.5–10.7)
WBC UR QL AUTO: ABNORMAL /HPF

## 2018-03-02 PROCEDURE — 83631 LACTOFERRIN FECAL (QUANT): CPT | Performed by: HOSPITALIST

## 2018-03-02 PROCEDURE — 83540 ASSAY OF IRON: CPT | Performed by: HOSPITALIST

## 2018-03-02 PROCEDURE — 25010000002 ENOXAPARIN PER 10 MG: Performed by: HOSPITALIST

## 2018-03-02 PROCEDURE — 84466 ASSAY OF TRANSFERRIN: CPT | Performed by: HOSPITALIST

## 2018-03-02 PROCEDURE — 84100 ASSAY OF PHOSPHORUS: CPT | Performed by: HOSPITALIST

## 2018-03-02 PROCEDURE — 80048 BASIC METABOLIC PNL TOTAL CA: CPT | Performed by: HOSPITALIST

## 2018-03-02 PROCEDURE — 84443 ASSAY THYROID STIM HORMONE: CPT | Performed by: HOSPITALIST

## 2018-03-02 PROCEDURE — 83735 ASSAY OF MAGNESIUM: CPT | Performed by: HOSPITALIST

## 2018-03-02 PROCEDURE — 25010000002 CEFTRIAXONE PER 250 MG: Performed by: EMERGENCY MEDICINE

## 2018-03-02 PROCEDURE — 82607 VITAMIN B-12: CPT | Performed by: HOSPITALIST

## 2018-03-02 PROCEDURE — 82746 ASSAY OF FOLIC ACID SERUM: CPT | Performed by: HOSPITALIST

## 2018-03-02 PROCEDURE — 85045 AUTOMATED RETICULOCYTE COUNT: CPT | Performed by: HOSPITALIST

## 2018-03-02 PROCEDURE — 87493 C DIFF AMPLIFIED PROBE: CPT | Performed by: HOSPITALIST

## 2018-03-02 PROCEDURE — 85027 COMPLETE CBC AUTOMATED: CPT | Performed by: HOSPITALIST

## 2018-03-02 RX ORDER — FERROUS SULFATE 325(65) MG
325 TABLET ORAL
Status: DISCONTINUED | OUTPATIENT
Start: 2018-03-02 | End: 2018-03-03

## 2018-03-02 RX ORDER — CEFTRIAXONE SODIUM 1 G/50ML
1 INJECTION, SOLUTION INTRAVENOUS EVERY 24 HOURS
Status: COMPLETED | OUTPATIENT
Start: 2018-03-03 | End: 2018-03-05

## 2018-03-02 RX ORDER — IPRATROPIUM BROMIDE AND ALBUTEROL SULFATE 2.5; .5 MG/3ML; MG/3ML
3 SOLUTION RESPIRATORY (INHALATION) EVERY 4 HOURS PRN
Status: DISCONTINUED | OUTPATIENT
Start: 2018-03-02 | End: 2018-03-06 | Stop reason: HOSPADM

## 2018-03-02 RX ORDER — DONEPEZIL HYDROCHLORIDE 10 MG/1
10 TABLET, FILM COATED ORAL NIGHTLY
Status: DISCONTINUED | OUTPATIENT
Start: 2018-03-02 | End: 2018-03-03

## 2018-03-02 RX ORDER — ONDANSETRON 4 MG/1
4 TABLET, FILM COATED ORAL EVERY 6 HOURS PRN
Status: DISCONTINUED | OUTPATIENT
Start: 2018-03-02 | End: 2018-03-06 | Stop reason: HOSPADM

## 2018-03-02 RX ORDER — ACETAMINOPHEN 325 MG/1
650 TABLET ORAL EVERY 4 HOURS PRN
Status: DISCONTINUED | OUTPATIENT
Start: 2018-03-02 | End: 2018-03-06 | Stop reason: HOSPADM

## 2018-03-02 RX ORDER — SODIUM CHLORIDE 0.9 % (FLUSH) 0.9 %
1-10 SYRINGE (ML) INJECTION AS NEEDED
Status: DISCONTINUED | OUTPATIENT
Start: 2018-03-02 | End: 2018-03-06 | Stop reason: HOSPADM

## 2018-03-02 RX ORDER — SODIUM CHLORIDE 9 MG/ML
100 INJECTION, SOLUTION INTRAVENOUS CONTINUOUS
Status: DISCONTINUED | OUTPATIENT
Start: 2018-03-02 | End: 2018-03-02

## 2018-03-02 RX ORDER — MIRTAZAPINE 15 MG/1
15 TABLET, FILM COATED ORAL NIGHTLY
Status: DISCONTINUED | OUTPATIENT
Start: 2018-03-02 | End: 2018-03-06 | Stop reason: HOSPADM

## 2018-03-02 RX ORDER — PANTOPRAZOLE SODIUM 40 MG/1
40 TABLET, DELAYED RELEASE ORAL EVERY MORNING
Status: DISCONTINUED | OUTPATIENT
Start: 2018-03-02 | End: 2018-03-03

## 2018-03-02 RX ORDER — UREA 10 %
1250 LOTION (ML) TOPICAL DAILY
Status: DISCONTINUED | OUTPATIENT
Start: 2018-03-02 | End: 2018-03-02 | Stop reason: CLARIF

## 2018-03-02 RX ORDER — ESCITALOPRAM OXALATE 10 MG/1
10 TABLET ORAL DAILY
Status: DISCONTINUED | OUTPATIENT
Start: 2018-03-02 | End: 2018-03-06 | Stop reason: HOSPADM

## 2018-03-02 RX ORDER — CEFTRIAXONE SODIUM 1 G/50ML
1 INJECTION, SOLUTION INTRAVENOUS ONCE
Status: COMPLETED | OUTPATIENT
Start: 2018-03-02 | End: 2018-03-02

## 2018-03-02 RX ORDER — SODIUM CHLORIDE 9 MG/ML
75 INJECTION, SOLUTION INTRAVENOUS CONTINUOUS
Status: DISCONTINUED | OUTPATIENT
Start: 2018-03-02 | End: 2018-03-03

## 2018-03-02 RX ORDER — ONDANSETRON 2 MG/ML
4 INJECTION INTRAMUSCULAR; INTRAVENOUS EVERY 6 HOURS PRN
Status: DISCONTINUED | OUTPATIENT
Start: 2018-03-02 | End: 2018-03-06 | Stop reason: HOSPADM

## 2018-03-02 RX ORDER — TAMSULOSIN HYDROCHLORIDE 0.4 MG/1
0.4 CAPSULE ORAL NIGHTLY
Status: DISCONTINUED | OUTPATIENT
Start: 2018-03-02 | End: 2018-03-06 | Stop reason: HOSPADM

## 2018-03-02 RX ORDER — PANTOPRAZOLE SODIUM 20 MG/1
20 TABLET, DELAYED RELEASE ORAL DAILY
Status: DISCONTINUED | OUTPATIENT
Start: 2018-03-02 | End: 2018-03-02 | Stop reason: CLARIF

## 2018-03-02 RX ORDER — CHOLESTYRAMINE 4 G/9G
1 POWDER, FOR SUSPENSION ORAL DAILY
Status: DISCONTINUED | OUTPATIENT
Start: 2018-03-02 | End: 2018-03-06 | Stop reason: HOSPADM

## 2018-03-02 RX ORDER — LOPERAMIDE HYDROCHLORIDE 2 MG/1
2 CAPSULE ORAL 4 TIMES DAILY PRN
Status: DISCONTINUED | OUTPATIENT
Start: 2018-03-02 | End: 2018-03-06 | Stop reason: HOSPADM

## 2018-03-02 RX ORDER — ONDANSETRON 4 MG/1
4 TABLET, ORALLY DISINTEGRATING ORAL EVERY 6 HOURS PRN
Status: DISCONTINUED | OUTPATIENT
Start: 2018-03-02 | End: 2018-03-06 | Stop reason: HOSPADM

## 2018-03-02 RX ORDER — CALCIUM CARBONATE 500(1250)
1000 TABLET ORAL DAILY
Status: DISCONTINUED | OUTPATIENT
Start: 2018-03-02 | End: 2018-03-06 | Stop reason: HOSPADM

## 2018-03-02 RX ADMIN — TAMSULOSIN HYDROCHLORIDE 0.4 MG: 0.4 CAPSULE ORAL at 20:54

## 2018-03-02 RX ADMIN — ENOXAPARIN SODIUM 30 MG: 30 INJECTION SUBCUTANEOUS at 08:04

## 2018-03-02 RX ADMIN — PANTOPRAZOLE SODIUM 40 MG: 40 TABLET, DELAYED RELEASE ORAL at 06:20

## 2018-03-02 RX ADMIN — SODIUM CHLORIDE 100 ML/HR: 9 INJECTION, SOLUTION INTRAVENOUS at 00:39

## 2018-03-02 RX ADMIN — VANCOMYCIN 125 MG: KIT at 17:11

## 2018-03-02 RX ADMIN — ESCITALOPRAM 10 MG: 10 TABLET, FILM COATED ORAL at 08:04

## 2018-03-02 RX ADMIN — MIRTAZAPINE 15 MG: 15 TABLET, FILM COATED ORAL at 20:55

## 2018-03-02 RX ADMIN — SODIUM CHLORIDE 100 ML/HR: 9 INJECTION, SOLUTION INTRAVENOUS at 07:46

## 2018-03-02 RX ADMIN — DONEPEZIL HYDROCHLORIDE 10 MG: 10 TABLET, FILM COATED ORAL at 20:55

## 2018-03-02 RX ADMIN — CEFTRIAXONE SODIUM 1 G: 1 INJECTION, SOLUTION INTRAVENOUS at 00:39

## 2018-03-02 RX ADMIN — FERROUS SULFATE TAB 325 MG (65 MG ELEMENTAL FE) 325 MG: 325 (65 FE) TAB at 08:04

## 2018-03-02 RX ADMIN — CHOLESTYRAMINE 1 PACKET: 4 POWDER, FOR SUSPENSION ORAL at 10:24

## 2018-03-02 NOTE — H&P
"HISTORY AND PHYSICAL   [facility]        Patient Identification:  Name: Yelitza Hernandez  Age: 89 y.o.  Sex: female  :  1925  MRN: [id number]                     Primary Care Physician: Ani Bentley MD    Chief Complaint:  \"I just don't feel good\".    History of Present Illness:   Pleasant 89-year-old female, nursing home resident with given history of dementia.  She can really give no details as to why she is here.  She is reported to have been transferred here when routine labs showed a markedly elevated BUN level.  She does have a history of chronic kidney disease stage III.  She also has a history of chronic diarrhea although no details as to the etiology or available.  She cannot give any insight to this.  It is noted that vancomycin is on her medication list but there is no mention of C. difficile colitis.  There are some redundancies on her medication list also some not sure how accurate this is over that this is a current medication or a passed medication.  Patient can give no details as to whether the diarrhea has been under control recently or worsened.  She gives no details as to what her oral intake has been.    Patient gives no history of urinary symptomatology but is reported that on catheterization very soupy, gray thick urine was present.    There is also history of chronic anemia with both anemia chronic disease as well as iron deficiency and a given history of being intolerant to oral iron supplements.        Past Medical History:  Past Medical History:   Diagnosis Date   • Anemia     Iron deficiency    • Anxiety    • Chronic kidney disease    • Coronary artery disease    • Dementia    • GERD (gastroesophageal reflux disease)    • Hypertension    • Renal disorder     stage 3     Past Surgical History:  History reviewed. No pertinent surgical history.   Home Meds:    (Not in a hospital admission)    Allergies:  Allergies   Allergen Reactions   • Niacin  "     Immunizations:    There is no immunization history on file for this patient.  Social History:   Social History     Social History Narrative     Social History   Substance Use Topics   • Smoking status: Never Smoker   • Smokeless tobacco: Never Used   • Alcohol use No     Family History:  History reviewed. No pertinent family history.     Review of Systems  Review of Systems   Unable to perform ROS: Dementia       Objective:  tMax 24 hrs: Temp (24hrs), Av.2 °F (36.2 °C), Min:97.2 °F (36.2 °C), Max:97.2 °F (36.2 °C)    Vitals Ranges:   Temp:  [97.2 °F (36.2 °C)] 97.2 °F (36.2 °C)  Heart Rate:  [55-60] 58  Resp:  [16-20] 16  BP: (119-164)/(47-80) 136/47      Exam:  Physical Exam   Constitutional: She appears well-developed. No distress.   Thin and frail appearing.   HENT:   Head: Normocephalic and atraumatic.   Right Ear: External ear normal.   Left Ear: External ear normal.   Nose: Nose normal.   Mucous membranes appear dry.   Eyes: Conjunctivae and EOM are normal. Right eye exhibits no discharge. Left eye exhibits no discharge. No scleral icterus.   Neck: Neck supple. No JVD present. No tracheal deviation present. No thyromegaly present.   Cardiovascular: Normal rate, regular rhythm and normal heart sounds.    Pedal pulses 1+ bilaterally.  Upper extremity pulses just shy 2+ bilaterally.   Pulmonary/Chest: Effort normal and breath sounds normal. No stridor. No respiratory distress. She exhibits no tenderness.   Abdominal: Soft. Bowel sounds are normal. She exhibits no distension and no mass. There is no tenderness. There is no rebound and no guarding.   Musculoskeletal: She exhibits no edema or deformity.   Neurological: She is alert.   Oriented person.  She is aware she is in a hospital.  She knew it was 2018 but no further details on the date.   Skin: Skin is warm and dry. She is not diaphoretic.   Psychiatric: She has a normal mood and affect. Her behavior is normal. Judgment and thought content normal.        Data Review:  All labs and radiology reviewed.    Assessment:  Principal Problem:    Dehydration:  IV hydration with normal saline.  Encourage oral intake.  Patient also appears malnourished now asked nutrition to evaluate for this.  Active Problems:    Urinary tract infection in elderly patient:  Patient unable to relay symptomatology.    Chronic diarrhea:  No available records as to previous workup.  Uncertain whether vancomycin is at present or past medication.  As patient is receiving IV antibiotics are going ahead and check for C. Difficile...    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  I'll have the iron levels rechecked in the morning if they still remain very low she may benefit from IV iron sucrose.    Macrocytic anemia:  A new finding.  I will check B12, folate etc. in the morning.    Dementia      Plan:  Please see above.    Addendum: It appears oral vancomycin was a past medication.  There is concerns for possible previous history C. difficile colitis.  We will want minimize her antibiotic exposure.    Portillo Smith MD  3/2/2018  2:13 AM    EMR Dragon/Transcription disclaimer:   Much of this encounter note is an electronic transcription/translation of spoken language to printed text. The electronic translation of spoken language may permit erroneous, or at times, nonsensical words or phrases to be inadvertently transcribed; Although I have reviewed the note for such errors, some may still exist.

## 2018-03-02 NOTE — ED PROVIDER NOTES
EMERGENCY DEPARTMENT ENCOUNTER    CHIEF COMPLAINT  Chief Complaint: Abnormal labs  History given by: patient, NH  History limited by: h/o dementia  Room Number: 44/44  PMD: Ani Bentley MD      HPI:  Pt is a 89 y.o. female with a h/o CKD who presents from [facility] complaining of elevated BUN (72) and creatinine (2.0) that were drawn today at the NH. Patient has chronic diarrhea,but fever, chills, CP, SOA, or any other sx.     Duration:  gradual  Onset: earlier today  Location: General  Radiation: none  Quality: abnormal labs  Intensity/Severity: moderate  Progression: constant  Associated Symptoms: not stated  Aggravating Factors: not stated  Alleviating Factors: none  Previous Episodes: none  Treatment before arrival: sent from NH    PAST MEDICAL HISTORY  Active Ambulatory Problems     Diagnosis Date Noted   • Iron deficiency anemia due to chronic blood loss 06/10/2016   • CKD (chronic kidney disease) 06/10/2016   • Rectal ulcer 06/10/2016   • Anemia of chronic renal failure, stage 2 (mild) 07/21/2016     Resolved Ambulatory Problems     Diagnosis Date Noted   • No Resolved Ambulatory Problems     Past Medical History:   Diagnosis Date   • Anemia    • Anxiety    • Chronic kidney disease    • Coronary artery disease    • Dementia    • GERD (gastroesophageal reflux disease)    • Hypertension    • Renal disorder        PAST SURGICAL HISTORY  History reviewed. No pertinent surgical history.    FAMILY HISTORY  History reviewed. No pertinent family history.    SOCIAL HISTORY  Social History     Social History   • Marital status:      Spouse name: N/A   • Number of children: N/A   • Years of education: N/A     Occupational History   • Not on file.     Social History Main Topics   • Smoking status: Never Smoker   • Smokeless tobacco: Never Used   • Alcohol use No   • Drug use: No   • Sexual activity: Not on file     Other Topics Concern   • Not on file     Social History Narrative        ALLERGIES  Niacin    REVIEW OF SYSTEMS  Review of Systems   Unable to perform ROS: Dementia       PHYSICAL EXAM  ED Triage Vitals   Temp Heart Rate Resp BP SpO2   03/01/18 2205 03/01/18 2205 03/01/18 2205 03/01/18 2205 03/01/18 2205   97.2 °F (36.2 °C) 60 18 164/66 97 %      Temp src Heart Rate Source Patient Position BP Location FiO2 (%)   03/01/18 2205 03/01/18 2205 03/01/18 2205 03/01/18 2205 --   Tympanic Monitor Sitting Right arm        Physical Exam   Constitutional: She is well-developed, well-nourished, and in no distress. No distress.   HENT:   Head: Normocephalic and atraumatic.   Mouth/Throat: Mucous membranes are dry.   Eyes: EOM are normal. Pupils are equal, round, and reactive to light.   Neck: Normal range of motion. Neck supple.   Cardiovascular: Normal rate, regular rhythm and normal heart sounds.    Pulmonary/Chest: Effort normal and breath sounds normal. No respiratory distress.   Abdominal: Soft. There is no tenderness. There is no rebound and no guarding.   Musculoskeletal: Normal range of motion. She exhibits no edema.   Neurological: She is alert. She has normal sensation and normal strength.   Skin: Skin is warm and dry. No rash noted.   Psychiatric: Mood and affect normal.   Nursing note and vitals reviewed.      LAB RESULTS  Lab Results (last 24 hours)     Procedure Component Value Units Date/Time    CBC & Differential [[id number]] Collected:  03/01/18 2255    Specimen:  Blood Updated:  03/01/18 5958    Narrative:       The following orders were created for panel order CBC & Differential.  Procedure                               Abnormality         Status                     ---------                               -----------         ------                     CBC Auto Differential[[id number]]        Abnormal            Final result                 Please view results for these tests on the individual orders.    Comprehensive Metabolic Panel [[id number]]  (Abnormal) Collected:  03/01/18  2255    Specimen:  Blood Updated:  03/02/18 0003     Glucose 95 mg/dL      BUN 57 (H) mg/dL      Creatinine 1.64 (H) mg/dL      Sodium 141 mmol/L      Potassium 4.8 mmol/L      Chloride 106 mmol/L      CO2 26.3 mmol/L      Calcium 8.6 mg/dL      Total Protein 7.0 g/dL      Albumin 2.70 (L) g/dL      ALT (SGPT) 14 U/L      AST (SGOT) 17 U/L      Alkaline Phosphatase 110 U/L      Total Bilirubin <0.2 mg/dL      eGFR Non African Amer 29 (L) mL/min/1.73      Globulin 4.3 gm/dL      A/G Ratio 0.6 g/dL      BUN/Creatinine Ratio 34.8 (H)     Anion Gap 8.7 mmol/L     Narrative:       The MDRD GFR formula is only valid for adults with stable renal function between ages 18 and 70.    Magnesium [[id number]]  (Normal) Collected:  03/01/18 2255    Specimen:  Blood Updated:  03/01/18 2359     Magnesium 2.1 mg/dL     CBC Auto Differential [[id number]]  (Abnormal) Collected:  03/01/18 2255    Specimen:  Blood Updated:  03/01/18 2338     WBC 7.32 10*3/mm3      RBC 3.21 (L) 10*6/mm3      Hemoglobin 9.8 (L) g/dL      Hematocrit 32.3 (L) %      .6 (H) fL      MCH 30.5 pg      MCHC 30.3 (L) g/dL      RDW 16.0 (H) %      RDW-SD 59.2 (H) fl      MPV 10.5 fL      Platelets 297 10*3/mm3      Neutrophil % 57.2 %      Lymphocyte % 27.7 %      Monocyte % 11.2 %      Eosinophil % 3.6 %      Basophil % 0.3 %      Immature Grans % 0.0 %      Neutrophils, Absolute 4.19 10*3/mm3      Lymphocytes, Absolute 2.03 10*3/mm3      Monocytes, Absolute 0.82 10*3/mm3      Eosinophils, Absolute 0.26 10*3/mm3      Basophils, Absolute 0.02 10*3/mm3      Immature Grans, Absolute 0.00 10*3/mm3     Urinalysis With / Culture If Indicated - Urine, Clean Catch [[id number]]  (Abnormal) Collected:  03/01/18 2328    Specimen:  Urine from Urine, Catheter Updated:  03/02/18 0001     Color, UA Other (A)     Appearance, UA Cloudy (A)     pH, UA 6.0     Specific Gravity, UA 1.011     Glucose, UA Negative     Ketones, UA Trace (A)     Bilirubin, UA Negative     Blood, UA  Large (3+) (A)     Protein,  mg/dL (2+) (A)     Leuk Esterase, UA Large (3+) (A)     Nitrite, UA Positive (A)     Urobilinogen, UA 0.2 E.U./dL    Urinalysis, Microscopic Only - Urine, Clean Catch [[id number]]  (Abnormal) Collected:  03/01/18 2328    Specimen:  Urine from Urine, Catheter Updated:  03/02/18 0014     RBC, UA  /HPF      Unable to determine due to loaded field (A)     WBC, UA Too Numerous to Count (A) /HPF      Bacteria, UA  /HPF      Unable to determine due to loaded field (A)     Squamous Epithelial Cells, UA  /HPF      Unable to determine due to loaded field (A)     Hyaline Casts, UA  /LPF      Unable to determine due to loaded field     Methodology Automated Microscopy    Urine Culture - Urine, Urine, Clean Catch [[id number]] Collected:  03/01/18 2328    Specimen:  Urine from Urine, Catheter Updated:  03/01/18 0232          I ordered the above labs and reviewed the results    PROCEDURES  Procedures      PROGRESS AND CONSULTS  ED Course     1250: Discussed case with Dr. Smith, [facility] hospitalist concerning the patient and the findings in the ED. Dr. Smith requests admittance to tele.       MEDICAL DECISION MAKING  Results were reviewed/discussed with the patient and they were also made aware of online access. Pt also made aware that some labs, such as cultures, will not be resulted during ER visit and follow up with PMD is necessary.     MDM       DIAGNOSIS  Final diagnoses:   Urinary tract infection in elderly patient   Acute kidney injury       DISPOSITION  ADMISSION    Discussed treatment plan and reason for admission with pt/family and admitting physician.  Pt/family voiced understanding of the plan for admission for further testing/treatment as needed.     Latest Documented Vital Signs:  As of 1:53 AM  BP- 136/47 HR- 58 Temp- 97.2 °F (36.2 °C) (Tympanic) O2 sat- 93%    --  Documentation assistance provided by alma Ace for MARYAN Raygoza.  Information recorded by the alma was  done at my direction and has been verified and validated by me.     Reshma Ace  03/02/18 0051       MARYAN Evans  03/02/18 0153

## 2018-03-02 NOTE — PLAN OF CARE
Problem: Nutrition, Imbalanced: Inadequate Oral Intake (Adult)  Intervention: Promote/Optimize Nutrition   03/02/18 1051   Nutrition Interventions   Oral Nutrition Promotion (Offer Ensure compact three times per day)

## 2018-03-02 NOTE — PROGRESS NOTES
Discharge Planning Assessment  [facility]     Patient Name: Yelitza Hernandez  MRN: [id number]  Today's Date: 3/2/2018    Admit Date: 3/1/2018          Discharge Needs Assessment       03/02/18 1337    Living Environment    Lives With facility resident    Living Arrangements other (see comments)   Lives long term care at [facility]    Home Accessibility no concerns    Stair Railings at Home none    Type of Financial/Environmental Concern none    Transportation Available ambulance    Living Environment    Provides Primary Care For no one, unable/limited ability to care for self    Able to Return to Prior Living Arrangements yes    Discharge Needs Assessment    Concerns To Be Addressed no discharge needs identified;denies needs/concerns at this time    Readmission Within The Last 30 Days no previous admission in last 30 days    Equipment Currently Used at Home wheelchair;hospital bed    Equipment Needed After Discharge none    Discharge Facility/Level Of Care Needs nursing facility, intermediate            Discharge Plan       03/02/18 1313    Case Management/Social Work Plan    Plan [facility] / Has Medicaid bed hold and can return anytime    Additional Comments I spoke with Casi with [facility] ([phone]), she said pt is from long term care bed and has a Medicaid bed hold and can return anytime.  HIPPA compliant voice mail left for pt's son Haroon Hernandez ([phone]), I requested a return call to confirm the plan at discharge is for pt to return to  [facility], I ask also if he is POA for pt that he bring a copy to the hospital to be scanned into pt's chart.  Transfer packet in pt's chart.               Penny Fountain RN          Discharge Placement     Facility/Agency Request Status Selected? Address Phone Number [facility]ax Number    [facility]     [address] [address] [phone]                 Demographic Summary       03/02/18 1336    Referral Information    Admission Type inpatient    Arrived From admitted as an inpatient;long-term care;nursing facility    Referral Source admission list    Record Reviewed medical record    Contact Information    Permission Granted to Share Information With family/designee;facility             Functional Status       03/02/18 1336    Functional Status Current    Ambulation 3-->assistive equipment and person    Transferring 3-->assistive equipment and person    Toileting 3-->assistive equipment and person    Bathing 3-->assistive equipment and person    Dressing 3-->assistive equipment and person    Eating 0-->independent    Communication 0-->understands/communicates without difficulty    Swallowing (if score 2 or more for any item, consult Rehab Services) 0-->swallows foods/liquids without difficulty    Change in Functional Status Since Onset of Current Illness/Injury no    IADL    IADL Comments --   Pt lives long term care at [facility]            Psychosocial     None            Abuse/Neglect     None            Legal       03/02/18 1340    Legal    Legal Considerations patient/family ability to make health care decisions;patient/ for healthcare needs;patient/family capacity to make sound judgments    Legal Comments --   I left voice mail for pt's son Haroon Hernandez and ask if he is POA for his mother that he bring copy to hospital to be scanned into pt's chart            Substance Abuse     None            Patient Forms       03/02/18 1339    Patient Forms    Provider Choice List --   Lives long term care at [facility].          Penny Fountain RN

## 2018-03-02 NOTE — PROGRESS NOTES
Malnutrition Severity Assessment    Patient Name:  Yelitza Hernandez  YOB: 1925  MRN: [id number]  Admit Date:  3/1/2018    Patient meets criteria for : Mild malnutrition    Comments:  Meets criteria for mild social/environmental circumstances malnutrition based on weight info, thin frail appearance,fat and muscles losses detailed below, report of poor intake/appetite, and skin breakdown.     Malnutrition Type: Social/Environmental Circumstance Malnutrition     Malnutrition Type (last 8 hours)      Malnutrition Severity Assessment       03/02/18 1041    Malnutrition Severity Assessment    Malnutrition Type Social/Environmental Circumstance Malnutrition      03/02/18 1041    Physical Signs of Malnutrition (Social/Environmental)    Muscle Wasting Mild   slight depression temporalis, slightly protruding clavicle    Fat Loss Mild   some depth pinch upper arm    Secondary Physical Signs Present (comment)   skin breakdown coccyx, heels      03/02/18 1041    Weight Status (Social/Environmental)    %UBW Mild (86-90%)      03/02/18 1041    Energy Intake Status (Social/Environmental)    Energy Intake Mild (<75% / 5d)   per patient history, somewhat limited due to dementia      03/02/18 1041    Criteria Met (Must meet criteria for severity in at least 2 of these categories: M Wasting, Fat Loss, Fluid, Secondary Signs, Wt. Status, Intake)    Patient meets criteria for  Mild malnutrition          Electronically signed by:  Anyi Aragon RD  03/02/18 10:58 AM

## 2018-03-02 NOTE — PLAN OF CARE
Problem: Patient Care Overview (Adult)  Goal: Plan of Care Review  Outcome: Ongoing (interventions implemented as appropriate)   03/02/18 0625   Coping/Psychosocial Response Interventions   Plan Of Care Reviewed With patient   Patient Care Overview   Progress no change   Outcome Evaluation   Outcome Summary/Follow up Plan VSS. IVF's infusing. pressure ulcers cleaned and covered with mepilex. Q2 turn. currently resting well     Goal: Adult Individualization and Mutuality  Outcome: Ongoing (interventions implemented as appropriate)    Goal: Discharge Needs Assessment  Outcome: Ongoing (interventions implemented as appropriate)      Problem: Fall Risk (Adult)  Goal: Identify Related Risk Factors and Signs and Symptoms  Outcome: Ongoing (interventions implemented as appropriate)    Goal: Absence of Falls  Outcome: Ongoing (interventions implemented as appropriate)      Problem: Skin Integrity Impairment, Risk/Actual (Adult)  Goal: Identify Related Risk Factors and Signs and Symptoms  Outcome: Ongoing (interventions implemented as appropriate)    Goal: Skin Integrity/Wound Healing  Outcome: Ongoing (interventions implemented as appropriate)      Problem: Infection, Risk/Actual (Adult)  Goal: Identify Related Risk Factors and Signs and Symptoms  Outcome: Outcome(s) achieved Date Met: 03/02/18    Goal: Infection Prevention/Resolution  Outcome: Ongoing (interventions implemented as appropriate)

## 2018-03-02 NOTE — PLAN OF CARE
Problem: Nutrition, Imbalanced: Inadequate Oral Intake (Adult)  Goal: Prevent Further Weight Loss  Outcome: Ongoing (interventions implemented as appropriate)

## 2018-03-02 NOTE — PLAN OF CARE
Problem: Patient Care Overview (Adult)  Goal: Adult Individualization and Mutuality  Outcome: Ongoing (interventions implemented as appropriate)    Goal: Discharge Needs Assessment  Outcome: Ongoing (interventions implemented as appropriate)      Problem: Fall Risk (Adult)  Goal: Identify Related Risk Factors and Signs and Symptoms  Outcome: Ongoing (interventions implemented as appropriate)    Goal: Absence of Falls  Outcome: Ongoing (interventions implemented as appropriate)      Problem: Skin Integrity Impairment, Risk/Actual (Adult)  Goal: Identify Related Risk Factors and Signs and Symptoms  Outcome: Ongoing (interventions implemented as appropriate)    Goal: Skin Integrity/Wound Healing  Outcome: Ongoing (interventions implemented as appropriate)      Problem: Infection, Risk/Actual (Adult)  Goal: Infection Prevention/Resolution  Outcome: Ongoing (interventions implemented as appropriate)      Problem: Nutrition, Imbalanced: Inadequate Oral Intake (Adult)  Goal: Identify Related Risk Factors and Signs and Symptoms  Outcome: Ongoing (interventions implemented as appropriate)    Goal: Improved Oral Intake  Outcome: Ongoing (interventions implemented as appropriate)

## 2018-03-02 NOTE — PLAN OF CARE
Problem: Patient Care Overview (Adult)  Goal: Plan of Care Review   03/02/18 5427   Outcome Evaluation   Outcome Summary/Follow up Plan VSS. Initiated and maintained contact spore isolation. IVF's discontinued, oral vancomycin started to treat C.diff. Wound nurse came to see patient today, cleaned and covered wounds with mepilex. Heel protector boots placed. Q2 turn. Pt is eating and drinking fluids well and independently. Pt is currently resting well at 03/02/18 1812.

## 2018-03-02 NOTE — CONSULTS
"Adult Nutrition  Assessment/PES    Patient Name:  Yelitza Hernandez  YOB: 1925  MRN: [id number]  Admit Date:  3/1/2018    Assessment Date:  3/2/2018    Comments:  Assessment completed and patient visited, nutrition focused exam completed on upper body as patient was complaining of being cold.  The patient reports poor appetite and states she has lost quite a bit of weight, appears thin and frail. Reports UBW of 140#.  Last available weight is 135# from 6/2016.  Completed MSA for mild social environmental malnutrition.  Patient is agreeable to Ensure. Will send three times a day and encourage intake. Follow for intake.           Reason for Assessment       03/02/18 1034    Reason for Assessment    Reason For Assessment/Visit admission assessment    Diagnosis Diagnosis    Fluid Status Dehydration    Gastrointestinal Diarrhea    Infectious Disease UTI    Neurological Dementia    Renal CKD              Nutrition/Diet History       03/02/18 1035    Nutrition/Diet History    Typical Food/Fluid Intake Patient reports that appetite has been poor.     Factors Affecting Nutritional Intake Factors    Other NH resident            Anthropometrics       03/02/18 1036    Anthropometrics    Height 165.1 cm (65\")    RD Documented Current Weight  53.5 kg (118 lb)    Anthropometrics (Special Considerations)    RD Calculated     RD Calculated % IBW 94    RD Calculated BMI (kg/m2) 19.6    Ideal Body Weight (IBW)    Ideal Body Weight (IBW), Female 57.69    Usual Body Weight (UBW)    Usual Body Weight 59.9 kg (132 lb)   6/2016 weight    % Usual Body Weight Malnutrition 80-90% - mild deficit    Weight Loss Time Frame Patient states #.  Last available weight prior to this admission that was not an estimated weight.      Body Mass Index (BMI)    BMI Grade 19.1 - 24.9 - normal            Labs/Tests/Procedures/Meds       03/02/18 1037    Labs/Tests/Procedures/Meds    Diagnostic Test/Procedure Review reviewed    " Labs/Tests Review Reviewed    Medication Review Reviewed, pertinent;Antibiotic   questran    Significant Vitals reviewed            Physical Findings       03/02/18 1038    Physical Findings/Assessment    Additional Documentation Physical Appearance (Group)    Physical Appearance    Overall Physical Appearance loss of muscle mass;underweight;loss of subcutaneous fat    Skin pressure ulcer(s)   coccyx ustageable, L heel, R heel            Estimated/Assessed Needs       03/02/18 1040    Calculation Measurements    Weight Used For Calculations 53.5 kg (118 lb)    Estimated/Assessed Energy Needs    Energy Need Method Kcal/kg    kcal/kg 35    35 Kcal/Kg (kcal) 1873.34    Estimated Kcal Range  4420-9906    Estimated/Assessed Protein Needs    Weight Used for Protein Calculation 53.5 kg (118 lb)    Protein (gm/kg) 1.2    1.2 Gm Protein (gm) 64.23    Estimated/Assessed Fluid Needs    Fluid Need Method RDA method    RDA Method (mL)  1600            Nutrition Prescription Ordered       03/02/18 1040    Nutrition Prescription PO    Current PO Diet Regular            Evaluation of Received Nutrient/Fluid Intake       03/02/18 1040    Evaluation of Received Nutrient/Fluid Intake    Nutrition Delivered Fluid Evaluation    Fluid Intake Evaluation    IV Fluid (mL) 2400    Total Fluid Intake (mL) 2400    Total Fluid Intake (mL/kg) 44.84 mL/kg    PO Evaluation    Number of Days PO Intake Evaluated Insufficient Data              Malnutrition Severity Assessment       03/02/18 1041    Malnutrition Severity Assessment    Malnutrition Type Social/Environmental Circumstance Malnutrition    Physical Signs of Malnutrition (Social/Environmental)    Muscle Wasting Mild   slight depression temporalis, slightly protruding clavicle    Fat Loss Mild   some depth pinch upper arm    Secondary Physical Signs Present (comment)   skin breakdown coccyx, heels    Weight Status (Social/Environmental)    %UBW Mild (86-90%)    Energy Intake Status  (Social/Environmental)    Energy Intake Mild (<75% / 5d)   per patient history, somewhat limited due to dementia    Criteria Met (Must meet criteria for severity in at least 2 of these categories: M Wasting, Fat Loss, Fluid, Secondary Signs, Wt. Status, Intake)    Patient meets criteria for  Mild malnutrition        Problem/Interventions:        Problem 1       03/02/18 1049    Nutrition Diagnoses Problem 1    Problem 1 Predicted Suboptimal Intake    Etiology (related to) Factors Affecting Nutrition;Medical Diagnosis    Skin Pressure ulcer    Reported/Observed By Patient    Appetite Poor    Reported GI Symptoms Diarrhea    Signs/Symptoms (evidenced by) Report of Mnimal PO Intake;% UBW;% IBW    Percent (%) IBW 94 %    Percent (%) UBW 87 %                    Intervention Goal       03/02/18 1050    Intervention Goal    PO Tolerate PO;Increase intake;PO intake (%)    PO Intake % 50 %    Weight No significant weight loss            Nutrition Intervention       03/02/18 1050    Nutrition Intervention    RD/Tech Action Encourage intake;Supplement provided;Follow Tx progress;Care plan reviewd;Recommend/ordered            Nutrition Prescription       03/02/18 1050    Nutrition Prescription PO    PO Prescription Begin/change supplement    Supplement Ensure   compact    Supplement Frequency 3 times a day    New PO Prescription Ordered? Yes            Education/Evaluation       03/02/18 1051    Monitor/Evaluation    Monitor Per protocol;PO intake;Supplement intake;Weight;Skin status        Electronically signed by:  Anyi Aragon RD  03/02/18 10:52 AM

## 2018-03-02 NOTE — ED TRIAGE NOTES
Ems reports call was for abnormal labs.  Pt has a history of chronic kidney disease.  Pt was sent in for elevated BUN and Creatinine.  Pt is alert and oriented at this time and does not have any complaints.

## 2018-03-02 NOTE — ED PROVIDER NOTES
Pt presents per NH with elevated BUN (72) and creatinine (2.0) after labs were drawn ealier toady. She denies any symptoms at this time.    On exam:  Heart: RRR without murmur  Lungs: CTAB without respiratory distress  Neuro: resting and in no acute distress    I reviewed pt's lab results which show BETH and UTI. Will admit. Pt understands and agrees with the plan. All questions answered.    I supervised care provided by the midlevel provider.    We have discussed this patient's history, physical exam, and treatment plan.   I have reviewed the note and personally saw and examined the patient and agree with the plan of care. Documentation assistance provided by alma Kurtz for Dr. Lyon.  Information recorded by the scribe was done at my direction and has been verified and validated by me.     Zuleyma Kurtz  03/02/18 0145       Chino Lyon MD  03/02/18 0429

## 2018-03-02 NOTE — DISCHARGE PLACEMENT REQUEST
"Presley Hernandez (89 y.o. Female)     Date of Birth Social Security Number Address Home Phone MRN    09/14/1925  [address] [facility] [id number] [phone] [id number]    Anglican Marital Status          None        Admission Date Admission Type Admitting Provider Attending Provider Department, Room/Bed    3/1/18 Emergency Portillo Smith MD [place], Ross Smith MD [address], P698/1    Discharge Date Discharge Disposition Discharge Destination                      Attending Provider: Ross Sloan MD     Allergies:  Niacin    Isolation:  None   Infection:  C.difficile (03/02/18)   Code Status:  FULL    Ht:  165.1 cm (65\")   Wt:  53.5 kg (118 lb)    Admission Cmt:  None   Principal Problem:  Dehydration [E86.0]                 Active Insurance as of 3/1/2018     Primary Coverage     Payor Plan Insurance Group Employer/Plan Group    [facility] MEDICARE REPLACEMENT [employer] HE[employer] C[employer] OPTIONS [id number]     Payor Plan Address Payor Plan Phone Number Effective From Effective To    [facility] [phone] 1/1/2018     [address]       [place] [address]       Subscriber Name Subscriber Birth Date Member ID       PRESLEY HERNANDEZ 9/14/1925 [id number]           Secondary Coverage     Payor Plan Insurance Group Employer/Plan Group    [place]      Payor Plan Address Payor Plan Phone Number Effective From Effective To    [address] [phone] 6/1/2016     [place] [address]       Subscriber Name Subscriber Birth Date Member ID       PRESLEY HERNANDEZ 9/14/1925 [id number]                 Emergency Contacts      (Rel.) Home Phone Work Phone Mobile Phone    Haroon Hernandez (Son) [phone] -- --    DavidKey (Other) -- -- [phone]            "

## 2018-03-02 NOTE — NURSING NOTE
WOCN assessment; patient has Pressure Injury on Coccyx. Accumax pump on bed.  Left heel wound/ Possible callous. Numerous red discolor areas on toes and legs. Do not appear pressure related. Heels offloaded Please order heel protector boots. Having multiple small stools starting to form. Perineum red  Apply moisture barrier cream     03/02/18 1550   Pressure Ulcer 03/02/18 0420 other (see comments) midline coccyx unstageable   Date first assessed/Time first assessed: 03/02/18 0420   Present On Admission (Pressure Ulcer): yes;picture taken  Side: other (see comments)  Orientation: midline  Location: coccyx  Stage: unstageable   Dressing Appearance moist drainage   Pressure Ulcer Appearance yellow;moist;not granulating;slough   Periwound Area blanchable   Length (Pressure Ulcer) (cm) 0.5   Width (Pressure Ulcer) (cm) 0.5   Depth (Pressure Ulcer) (cm) 0.1   Pressure Ulcer Wound Care cleansed with;soap and water   Dressing (silicone border dressing applied)   Pressure Ulcer 03/02/18 0420 Left distal heel other (see comments)   Date first assessed/Time first assessed: 03/02/18 0420   Side: Left  Orientation: distal  Location: heel  Stage: other (see comments)  Additional Comments: healing wound   Dressing Appearance no drainage   Pressure Ulcer Appearance (yellow appears callous )   Dressing (silicone border dressing applied)

## 2018-03-03 ENCOUNTER — APPOINTMENT (OUTPATIENT)
Dept: CT IMAGING | Facility: HOSPITAL | Age: 83
End: 2018-03-03

## 2018-03-03 PROBLEM — A04.72 C. DIFFICILE COLITIS: Status: ACTIVE | Noted: 2018-03-03

## 2018-03-03 LAB
ANION GAP SERPL CALCULATED.3IONS-SCNC: 9.8 MMOL/L
BASOPHILS # BLD AUTO: 0.01 10*3/MM3 (ref 0–0.2)
BASOPHILS NFR BLD AUTO: 0.2 % (ref 0–1.5)
BUN BLD-MCNC: 39 MG/DL (ref 8–23)
BUN/CREAT SERPL: 31.5 (ref 7–25)
CALCIUM SPEC-SCNC: 8.3 MG/DL (ref 8.2–9.6)
CHLORIDE SERPL-SCNC: 109 MMOL/L (ref 98–107)
CO2 SERPL-SCNC: 24.2 MMOL/L (ref 22–29)
CREAT BLD-MCNC: 1.24 MG/DL (ref 0.57–1)
DEPRECATED RDW RBC AUTO: 58.7 FL (ref 37–54)
EOSINOPHIL # BLD AUTO: 0.29 10*3/MM3 (ref 0–0.7)
EOSINOPHIL NFR BLD AUTO: 4.4 % (ref 0.3–6.2)
ERYTHROCYTE [DISTWIDTH] IN BLOOD BY AUTOMATED COUNT: 15.9 % (ref 11.7–13)
GFR SERPL CREATININE-BSD FRML MDRD: 40 ML/MIN/1.73
GLUCOSE BLD-MCNC: 85 MG/DL (ref 65–99)
HCT VFR BLD AUTO: 32.1 % (ref 35.6–45.5)
HGB BLD-MCNC: 9.6 G/DL (ref 11.9–15.5)
IMM GRANULOCYTES # BLD: 0 10*3/MM3 (ref 0–0.03)
IMM GRANULOCYTES NFR BLD: 0 % (ref 0–0.5)
LYMPHOCYTES # BLD AUTO: 2.08 10*3/MM3 (ref 0.9–4.8)
LYMPHOCYTES NFR BLD AUTO: 31.3 % (ref 19.6–45.3)
MCH RBC QN AUTO: 30.2 PG (ref 26.9–32)
MCHC RBC AUTO-ENTMCNC: 29.9 G/DL (ref 32.4–36.3)
MCV RBC AUTO: 100.9 FL (ref 80.5–98.2)
MONOCYTES # BLD AUTO: 0.65 10*3/MM3 (ref 0.2–1.2)
MONOCYTES NFR BLD AUTO: 9.8 % (ref 5–12)
NEUTROPHILS # BLD AUTO: 3.61 10*3/MM3 (ref 1.9–8.1)
NEUTROPHILS NFR BLD AUTO: 54.3 % (ref 42.7–76)
PLATELET # BLD AUTO: 277 10*3/MM3 (ref 140–500)
PMV BLD AUTO: 10.1 FL (ref 6–12)
POTASSIUM BLD-SCNC: 4.6 MMOL/L (ref 3.5–5.2)
RBC # BLD AUTO: 3.18 10*6/MM3 (ref 3.9–5.2)
SODIUM BLD-SCNC: 143 MMOL/L (ref 136–145)
WBC NRBC COR # BLD: 6.64 10*3/MM3 (ref 4.5–10.7)

## 2018-03-03 PROCEDURE — 25010000002 CEFTRIAXONE PER 250 MG: Performed by: HOSPITALIST

## 2018-03-03 PROCEDURE — 94799 UNLISTED PULMONARY SVC/PX: CPT

## 2018-03-03 PROCEDURE — 85025 COMPLETE CBC W/AUTO DIFF WBC: CPT | Performed by: INTERNAL MEDICINE

## 2018-03-03 PROCEDURE — 25010000002 ENOXAPARIN PER 10 MG: Performed by: HOSPITALIST

## 2018-03-03 PROCEDURE — 74176 CT ABD & PELVIS W/O CONTRAST: CPT

## 2018-03-03 PROCEDURE — 25010000002 IRON SUCROSE PER 1 MG: Performed by: INTERNAL MEDICINE

## 2018-03-03 PROCEDURE — 80048 BASIC METABOLIC PNL TOTAL CA: CPT | Performed by: HOSPITALIST

## 2018-03-03 RX ORDER — FOLIC ACID 1 MG/1
1 TABLET ORAL DAILY
Status: DISCONTINUED | OUTPATIENT
Start: 2018-03-03 | End: 2018-03-06 | Stop reason: HOSPADM

## 2018-03-03 RX ORDER — IPRATROPIUM BROMIDE AND ALBUTEROL SULFATE 2.5; .5 MG/3ML; MG/3ML
3 SOLUTION RESPIRATORY (INHALATION)
Status: DISCONTINUED | OUTPATIENT
Start: 2018-03-03 | End: 2018-03-06 | Stop reason: HOSPADM

## 2018-03-03 RX ADMIN — ENOXAPARIN SODIUM 30 MG: 30 INJECTION SUBCUTANEOUS at 09:23

## 2018-03-03 RX ADMIN — CALCIUM 1000 MG: 500 TABLET ORAL at 09:22

## 2018-03-03 RX ADMIN — CEFTRIAXONE SODIUM 1 G: 1 INJECTION, SOLUTION INTRAVENOUS at 00:57

## 2018-03-03 RX ADMIN — MIRTAZAPINE 15 MG: 15 TABLET, FILM COATED ORAL at 21:42

## 2018-03-03 RX ADMIN — CHOLESTYRAMINE 1 PACKET: 4 POWDER, FOR SUSPENSION ORAL at 09:23

## 2018-03-03 RX ADMIN — VANCOMYCIN 125 MG: KIT at 06:46

## 2018-03-03 RX ADMIN — VANCOMYCIN 125 MG: KIT at 00:57

## 2018-03-03 RX ADMIN — FERROUS SULFATE TAB 325 MG (65 MG ELEMENTAL FE) 325 MG: 325 (65 FE) TAB at 09:22

## 2018-03-03 RX ADMIN — IRON SUCROSE 300 MG: 20 INJECTION, SOLUTION INTRAVENOUS at 18:31

## 2018-03-03 RX ADMIN — PANTOPRAZOLE SODIUM 40 MG: 40 TABLET, DELAYED RELEASE ORAL at 06:46

## 2018-03-03 RX ADMIN — FOLIC ACID 1 MG: 1 TABLET ORAL at 18:15

## 2018-03-03 RX ADMIN — TAMSULOSIN HYDROCHLORIDE 0.4 MG: 0.4 CAPSULE ORAL at 21:42

## 2018-03-03 RX ADMIN — VANCOMYCIN 125 MG: KIT at 12:26

## 2018-03-03 RX ADMIN — ESCITALOPRAM 10 MG: 10 TABLET, FILM COATED ORAL at 09:22

## 2018-03-03 RX ADMIN — IPRATROPIUM BROMIDE AND ALBUTEROL SULFATE 3 ML: .5; 3 SOLUTION RESPIRATORY (INHALATION) at 09:44

## 2018-03-03 RX ADMIN — VANCOMYCIN 125 MG: KIT at 18:15

## 2018-03-03 RX ADMIN — ACETAMINOPHEN 650 MG: 325 TABLET ORAL at 21:42

## 2018-03-03 NOTE — PLAN OF CARE
Problem: Patient Care Overview (Adult)  Goal: Plan of Care Review  Outcome: Ongoing (interventions implemented as appropriate)   03/03/18 0534   Coping/Psychosocial Response Interventions   Plan Of Care Reviewed With patient   Patient Care Overview   Progress no change   Outcome Evaluation   Outcome Summary/Follow up Plan still having loose stools, on oral Vancomycin, incontince care given, turned every 2 hours, on falls precaution, VSS     Goal: Adult Individualization and Mutuality  Outcome: Ongoing (interventions implemented as appropriate)    Goal: Discharge Needs Assessment  Outcome: Ongoing (interventions implemented as appropriate)      Problem: Fall Risk (Adult)  Goal: Identify Related Risk Factors and Signs and Symptoms  Outcome: Outcome(s) achieved Date Met: 03/03/18    Goal: Absence of Falls  Outcome: Ongoing (interventions implemented as appropriate)      Problem: Skin Integrity Impairment, Risk/Actual (Adult)  Goal: Identify Related Risk Factors and Signs and Symptoms  Outcome: Outcome(s) achieved Date Met: 03/03/18    Goal: Skin Integrity/Wound Healing  Outcome: Ongoing (interventions implemented as appropriate)      Problem: Infection, Risk/Actual (Adult)  Goal: Infection Prevention/Resolution  Outcome: Ongoing (interventions implemented as appropriate)      Problem: Nutrition, Imbalanced: Inadequate Oral Intake (Adult)  Goal: Identify Related Risk Factors and Signs and Symptoms  Outcome: Outcome(s) achieved Date Met: 03/03/18    Goal: Improved Oral Intake  Outcome: Ongoing (interventions implemented as appropriate)    Goal: Prevent Further Weight Loss  Outcome: Ongoing (interventions implemented as appropriate)

## 2018-03-03 NOTE — CONSULTS
CONSULT NOTE    Infectious Diseases - Livia Roman MD  [facility]       Patient Identification:  Name: Yelitza Hernandez  Age: 89 y.o.  Sex: female  :  1925  MRN: [id number]             Date of Consultation: 3/3/2018        Primary Care Physician: Ani Bentley MD                               Requesting Physician: Dr. Yane Schwab  Reason for Consultation: Urinary tract infection with underlying C. difficile in a patient with dementia    Impression: 89-year-old female with:  1-toxic metabolic encephalopathy improved secondary to  2-urinary tract infection with superimposed C. difficile diarrhea  3-abnormal BUN/creatinine ratio reflection of free water deficit but other issues need to be considered such as obstructive uropathy and GI bleed.  4-recent hospitalization at [facility] in December for similar presentation  4-dementia      Recommendations/Discussions: At this juncture she looks much better on the treatment initiated for urinary tract infection and C. difficile diarrhea.  Would recommend discontinuation of Protonix in the context of active C. difficile infection and limit the course of broad-spectrum antibiotics for UTI tube as shorter duration as possible.  The fact that she continuously having abnormal urinalysis and recurrent urinary tract infection one wonders if she has structural  abnormalities.  Is reasonable to rule out these possibilities such as bladder stones nephrolithiasis or obstruction.  Possibility of continuing malignancy is still there.  We'll follow the urine culture results and further adjust antibiotic therapy and recommend 3-5 days of treatment.  Would recommend treatment for C. difficile infection for 2 weeks from the last day of broad-spectrum antibiotic therapy.    Thank you Dr. Schwab for letting me be the part of the patient care please see above impression and recommendation      History of Present Illness:   Patient is a 89-year-old female  who was admitted yesterday with abnormal lab results in the context of diarrhea and abnormal urinalysis.  She was hospitalized for a few days at [facility] in December.  She has persistently abnormal urinalysis despite no obvious evidence of indwelling Gilbert catheter.  She does not have any obvious urinary symptoms and the only manifestation she has from these infectious distresses is altered mental status and confusion.  At this point it does not sure that her recent change in status was due to urinary tract infection or C. difficile infection or the combination of both.  She is currently improved and knows that she's at [facility] and denies any specific complaints.      Past Medical History:  Past Medical History:   Diagnosis Date   • Anemia     Iron deficiency    • Anxiety    • Chronic kidney disease    • Coronary artery disease    • Dementia    • GERD (gastroesophageal reflux disease)    • Hypertension    • Renal disorder     stage 3     Past Surgical History:  History reviewed. No pertinent surgical history.   Home Meds:  Prescriptions Prior to Admission   Medication Sig Dispense Refill Last Dose   • acetaminophen (TYLENOL) 325 MG tablet Take 650 mg by mouth Every 6 (Six) Hours As Needed for Mild Pain .   Unknown at Unknown time   • calcium carbonate (OS-HERB) 600 MG tablet Take 600 mg by mouth Daily.   1/22/2018 at Unknown time   • cholestyramine (QUESTRAN) 4 g packet Take 1 packet by mouth 3 (Three) Times a Day With Meals.   1/22/2018 at Unknown time   • donepezil (ARICEPT) 10 MG tablet Take 10 mg by mouth Every Night.   1/22/2018 at Unknown time   • escitalopram (LEXAPRO) 10 MG tablet Take 10 mg by mouth Daily.   1/22/2018 at Unknown time   • ferrous sulfate 325 (65 FE) MG tablet Take 325 mg by mouth Daily With Breakfast.   1/22/2018 at Unknown time   • furosemide (LASIX) 20 MG tablet Take 20 mg by mouth 2 (Two) Times a Day.   1/22/2018 at Unknown time   • ipratropium-albuterol (DUONEB)  0.5-2.5 mg/mL nebulizer Take 3 mL by nebulization Every 4 (Four) Hours As Needed for Wheezing.   Unknown at Unknown time   • loperamide (IMODIUM) 2 MG capsule Take 2 mg by mouth 4 (four) times a day as needed for diarrhea.   Taking   • pantoprazole (PROTONIX) 40 MG EC tablet Take 40 mg by mouth.   Patient Taking Differently at Unknown time   • sulfamethoxazole-trimethoprim (BACTRIM,SEPTRA) 400-80 MG tablet Take 1 tablet by mouth 2 (Two) Times a Day.   1/22/2018 at Unknown time   • tamsulosin (FLOMAX) 0.4 MG capsule 24 hr capsule Take 1 capsule by mouth Every Night.   1/22/2018 at Unknown time   • tuberculin (TUBERSOL) 5 UNIT/0.1ML injection Inject  into the skin 1 (One) Time.   More than a month at Unknown time   • escitalopram (LEXAPRO) 20 MG tablet Take 10 mg by mouth.   Patient Taking Differently   • furosemide (LASIX) 20 MG tablet    Taking   • mirtazapine (REMERON) 15 MG tablet Take 15 mg by mouth.   Taking   • pantoprazole (PROTONIX) 20 MG EC tablet Take 20 mg by mouth Daily.   1/22/2018 at Unknown time   • traMADol (ULTRAM) 50 MG tablet Take  mg by mouth every 6 (six) hours.   Taking   • vancomycin (VANCOCIN HCL) 125 MG capsule Take 125 mg by mouth 4 (Four) Times a Day.   1/22/2018 at Unknown time     Current Meds:     Current Facility-Administered Medications:   •  acetaminophen (TYLENOL) tablet 650 mg, 650 mg, Oral, Q4H PRN, Portillo Smith MD  •  calcium carbonate tablet 1,000 mg, 1,000 mg, Oral, Daily, Portillo Smith MD, 1,000 mg at 03/03/18 0922  •  cefTRIAXone (ROCEPHIN) IVPB 1 g, 1 g, Intravenous, Q24H, Portillo Smith MD, Last Rate: 100 mL/hr at 03/03/18 0057, 1 g at 03/03/18 0057  •  cholestyramine (QUESTRAN) packet 1 packet, 1 packet, Oral, Daily, Portillo Smith MD, 1 packet at 03/03/18 0923  •  donepezil (ARICEPT) tablet 10 mg, 10 mg, Oral, Nightly, Portillo Smith MD, 10 mg at 03/02/18 2055  •  enoxaparin (LOVENOX) syringe 30 mg, 30 mg, Subcutaneous, Q24H, Portillo JOHANSEN  MD Sarah, 30 mg at 03/03/18 0923  •  escitalopram (LEXAPRO) tablet 10 mg, 10 mg, Oral, Daily, Portillo Smith MD, 10 mg at 03/03/18 0922  •  ferrous sulfate tablet 325 mg, 325 mg, Oral, Daily With Breakfast, Portillo Smith MD, 325 mg at 03/03/18 0922  •  ipratropium-albuterol (DUO-NEB) nebulizer solution 3 mL, 3 mL, Nebulization, Q4H PRN, Portillo Smith MD  •  ipratropium-albuterol (DUO-NEB) nebulizer solution 3 mL, 3 mL, Nebulization, TID - RT, Yane Schwab MD, 3 mL at 03/03/18 0944  •  loperamide (IMODIUM) capsule 2 mg, 2 mg, Oral, 4x Daily PRN, Portillo Smith MD  •  mirtazapine (REMERON) tablet 15 mg, 15 mg, Oral, Nightly, Portillo Smith MD, 15 mg at 03/02/18 2055  •  ondansetron (ZOFRAN) tablet 4 mg, 4 mg, Oral, Q6H PRN **OR** ondansetron ODT (ZOFRAN-ODT) disintegrating tablet 4 mg, 4 mg, Oral, Q6H PRN **OR** ondansetron (ZOFRAN) injection 4 mg, 4 mg, Intravenous, Q6H PRN, Portillo Smith MD  •  pantoprazole (PROTONIX) EC tablet 40 mg, 40 mg, Oral, QAM, Portillo Smith MD, 40 mg at 03/03/18 0646  •  sodium chloride 0.9 % flush 1-10 mL, 1-10 mL, Intravenous, PRN, Portillo Smith MD  •  Insert peripheral IV, , , Once **AND** sodium chloride 0.9 % flush 10 mL, 10 mL, Intravenous, PRN, MARYAN Evans  •  tamsulosin (FLOMAX) 24 hr capsule 0.4 mg, 0.4 mg, Oral, Nightly, Portillo Smith MD, 0.4 mg at 03/02/18 2054  •  vancomycin oral solution 125 mg, 125 mg, Oral, Q6H, Ross Sloan MD, 125 mg at 03/03/18 0646  Allergies:  Allergies   Allergen Reactions   • Niacin      Social History:   Social History   Substance Use Topics   • Smoking status: Never Smoker   • Smokeless tobacco: Never Used   • Alcohol use No      Family History:  History reviewed. No pertinent family history.       Review of Systems  See history of present illness and past medical history. Limited by dementia      Vitals:   /63 (BP Location: Right arm, Patient Position: Lying)   "Pulse 60  Temp 98 °F (36.7 °C) (Oral)   Resp 20  Ht 165.1 cm (65\")  Wt 53.5 kg (118 lb)  SpO2 95%  BMI 19.64 kg/m2  I/O:   Intake/Output Summary (Last 24 hours) at 03/03/18 0955  Last data filed at 03/03/18 0935   Gross per 24 hour   Intake              610 ml   Output                0 ml   Net              610 ml     Exam:  General Appearance:    Alert, cooperative, no distress, appears stated age   Head:    Normocephalic, without obvious abnormality, atraumatic   Eyes:    PERRL, conjunctiva/corneas clear, EOM's intact, both eyes   Ears:    Normal external ear canals, both ears   Nose:   Nares normal, septum midline, mucosa normal, no drainage    or sinus tenderness   Throat:   Lips, tongue, gums normal; oral mucosa pink and moist   Neck:   Supple, symmetrical, trachea midline, no adenopathy;     thyroid:  no enlargement/tenderness/nodules; no carotid    bruit or JVD   Back:     Symmetric, no curvature, ROM normal, Questionable mild right CVA tenderness   Lungs:     Clear to auscultation bilaterally, respirations unlabored   Chest Wall:    No tenderness or deformity    Heart:    Regular rate and rhythm, S1 and S2 normal, no murmur, rub   or gallop   Abdomen:     Soft, non-tender, bowel sounds active all four quadrants,     no masses, no hepatomegaly, no splenomegaly   Extremities:   Extremities normal, atraumatic, no cyanosis or edema   Pulses:   Pulses palpable in all extremities; symmetric all extremities   Skin:   Skin color normal, Skin is warm and dry,  no rashes or palpable lesions   Neurologic:   Grossly nonfocal       Data Review:    I reviewed the patient's new clinical results.    Results from last 7 days  Lab Units 03/03/18  0523 03/02/18  0522 03/01/18  2255   WBC 10*3/mm3 6.64 7.55 7.32   HEMOGLOBIN g/dL 9.6* 9.5* 9.8*   PLATELETS 10*3/mm3 277 271 297       Results from last 7 days  Lab Units 03/03/18  0523 03/02/18  0522 03/02/18  0521 03/01/18  2255   SODIUM mmol/L 143 140 141 141   POTASSIUM " mmol/L 4.6 4.7 4.8 4.8   CHLORIDE mmol/L 109* 107 107 106   CO2 mmol/L 24.2 24.1 24.4 26.3   BUN mg/dL 39* 50* 47* 57*   CREATININE mg/dL 1.24* 1.43* 1.44* 1.64*   CALCIUM mg/dL 8.3 8.5 8.6 8.6   GLUCOSE mg/dL 85 92 93 95     Microbiology Results (last 10 days)     Procedure Component Value - Date/Time    Clostridium Difficile Toxin - Stool, Per Rectum [[id number]] Collected:  03/02/18 0409    Lab Status:  Final result Specimen:  Stool from Per Rectum Updated:  03/02/18 0807    Narrative:       The following orders were created for panel order Clostridium Difficile Toxin - Stool, Per Rectum.  Procedure                               Abnormality         Status                     ---------                               -----------         ------                     Clostridium Difficile To...[[id number]]  Abnormal            Final result                 Please view results for these tests on the individual orders.    Clostridium Difficile Toxin, PCR - Stool, Per Rectum [[id number]]  (Abnormal) Collected:  03/02/18 0409    Lab Status:  Final result Specimen:  Stool from Per Rectum Updated:  03/02/18 0807     C. Difficile Toxins by PCR Positive (A)    Urine Culture - Urine, Urine, Clean Catch [[id number]]  (Abnormal) Collected:  03/01/18 2324    Lab Status:  Preliminary result Specimen:  Urine from Urine, Catheter Updated:  03/03/18 0803     Urine Culture --      >100,000 CFU/mL Gram Negative Bacilli (A)          Assessment:  Active Hospital Problems (** Indicates Principal Problem)    Diagnosis Date Noted   • **Dehydration [E86.0] 03/02/2018   • Urinary tract infection in elderly patient [N39.0] 03/02/2018   • CKD (chronic kidney disease), stage III [N18.3] 03/02/2018   • Chronic diarrhea [K52.9] 03/02/2018   • 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         Plan:  See above  Livia Dawkins MD   3/3/2018  9:55 AM    Much of this encounter note is an electronic transcription/translation of spoken language to printed text. The electronic translation of spoken language may permit erroneous, or at times, nonsensical words or phrases to be inadvertently transcribed; Although I have reviewed the note for such errors, some may still exist

## 2018-03-03 NOTE — PROGRESS NOTES
"     LOS: 1 day   Primary Care Physician: Ani Bentley MD     Subjective   Confused although pleasant.  She did eat lunch and also a little bit of breakfast.  She's had 3 bowel movements so far today.  She denies pain    Vital Signs  Body mass index is 19.64 kg/(m^2).  Temp:  [97 °F (36.1 °C)-98.3 °F (36.8 °C)] 97 °F (36.1 °C)  Heart Rate:  [60-64] 62  Resp:  [16-20] 18  BP: (150-176)/(51-64) 162/60      Objective:  General Appearance:  In no acute distress (Looks younger than her age).    Vital signs: (most recent): Blood pressure 162/60, pulse 62, temperature 97 °F (36.1 °C), temperature source Oral, resp. rate 18, height 165.1 cm (65\"), weight 53.5 kg (118 lb), SpO2 94 %.    HEENT: (No JVD.  Neck supple.  No lymphadenopathy.  Trachea midline)    Lungs:  There are decreased breath sounds.  No wheezes, rales or rhonchi.    Heart: Normal rate.  No murmur.   Abdomen: Abdomen is soft and non-distended.  Bowel sounds are normal.   There is no abdominal tenderness.   There is no splenomegaly. There is no hepatomegaly.   Extremities: There is no dependent edema.    Neurological: Patient is alert.    Skin:  Warm and dry.  (Tiny abrasion left lateral knee)        Results Review:    I reviewed the patient's new clinical results.      Results from last 7 days  Lab Units 03/03/18  0523 03/02/18  0522   WBC 10*3/mm3 6.64 7.55   HEMOGLOBIN g/dL 9.6* 9.5*   PLATELETS 10*3/mm3 277 271       Results from last 7 days  Lab Units 03/03/18  0523 03/02/18  0522   SODIUM mmol/L 143 140   POTASSIUM mmol/L 4.6 4.7   CHLORIDE mmol/L 109* 107   CO2 mmol/L 24.2 24.1   BUN mg/dL 39* 50*   CREATININE mg/dL 1.24* 1.43*   CALCIUM mg/dL 8.3 8.5   GLUCOSE mg/dL 85 92         Hemoglobin A1C:No results found for: HGBA1C    Glucose Range:No results found for: POCGLU    Lab Results   Component Value Date    [id number] 404 03/02/2018       Lab Results   Component Value Date    TSH 2.190 03/02/2018       Assessment & Plan      Medication " Review: Yes    Active Hospital Problems (** Indicates Principal Problem)    Diagnosis Date Noted   • **C. difficile colitis [A04.72] 03/03/2018   • Urinary tract infection in elderly patient [N39.0] 03/02/2018   • Dehydration [E86.0] 03/02/2018   • CKD (chronic kidney disease), stage III [N18.3] 03/02/2018   • Chronic diarrhea [K52.9] 03/02/2018   • Anemia due to multiple mechanisms [D64.9] 03/02/2018   • Macrocytic anemia [D53.9] 03/02/2018   • Dementia [F03.90] 03/02/2018      Resolved Hospital Problems    Diagnosis Date Noted Date Resolved   No resolved problems to display.       Assessment/Plan  1.  C. difficile colitis.  Continue Questran and oral vancomycin.  Stop oral iron as it may be causing some GI upset.  2.  Urinary tract infection.  Await cultures.  3 days of Rocephin planned at this time.  Appreciate input from infectious diseases. Last CAT scan from [facility] showed large area of communication between colon and vagina  3.  Anemia with folate deficiency and iron deficiency..  Likely also has component of chronic disease.  Start folate replacement.  IV iron ×1 dose  4.  Chronic kidney disease stage III with acute kidney injury.  Creatinine is much improved.  Recheck in a.m.  5.  Dementia.  She is on Aricept which may be contributing to the diarrhea.  Would consider stopping this given her advanced age and the status of the dementia itself.    Yane Schwab MD  03/03/18  4:34 PM

## 2018-03-04 PROBLEM — N39.0 URINARY TRACT INFECTION IN ELDERLY PATIENT: Status: RESOLVED | Noted: 2018-03-02 | Resolved: 2018-03-04

## 2018-03-04 PROBLEM — E86.0 DEHYDRATION: Status: RESOLVED | Noted: 2018-03-02 | Resolved: 2018-03-04

## 2018-03-04 PROBLEM — D52.9 FOLATE DEFICIENCY ANEMIA: Status: ACTIVE | Noted: 2018-03-04

## 2018-03-04 LAB
ANION GAP SERPL CALCULATED.3IONS-SCNC: 8.7 MMOL/L
BUN BLD-MCNC: 31 MG/DL (ref 8–23)
BUN/CREAT SERPL: 25.8 (ref 7–25)
CALCIUM SPEC-SCNC: 8.4 MG/DL (ref 8.2–9.6)
CHLORIDE SERPL-SCNC: 111 MMOL/L (ref 98–107)
CO2 SERPL-SCNC: 23.3 MMOL/L (ref 22–29)
CREAT BLD-MCNC: 1.2 MG/DL (ref 0.57–1)
DEPRECATED RDW RBC AUTO: 59 FL (ref 37–54)
ERYTHROCYTE [DISTWIDTH] IN BLOOD BY AUTOMATED COUNT: 16 % (ref 11.7–13)
GFR SERPL CREATININE-BSD FRML MDRD: 42 ML/MIN/1.73
GLUCOSE BLD-MCNC: 82 MG/DL (ref 65–99)
HCT VFR BLD AUTO: 27.8 % (ref 35.6–45.5)
HGB BLD-MCNC: 8.6 G/DL (ref 11.9–15.5)
MCH RBC QN AUTO: 30.9 PG (ref 26.9–32)
MCHC RBC AUTO-ENTMCNC: 30.9 G/DL (ref 32.4–36.3)
MCV RBC AUTO: 100 FL (ref 80.5–98.2)
PLATELET # BLD AUTO: 271 10*3/MM3 (ref 140–500)
PMV BLD AUTO: 10.3 FL (ref 6–12)
POTASSIUM BLD-SCNC: 4.6 MMOL/L (ref 3.5–5.2)
RBC # BLD AUTO: 2.78 10*6/MM3 (ref 3.9–5.2)
SODIUM BLD-SCNC: 143 MMOL/L (ref 136–145)
WBC NRBC COR # BLD: 6.92 10*3/MM3 (ref 4.5–10.7)

## 2018-03-04 PROCEDURE — 25010000002 CEFTRIAXONE PER 250 MG: Performed by: HOSPITALIST

## 2018-03-04 PROCEDURE — 94799 UNLISTED PULMONARY SVC/PX: CPT

## 2018-03-04 PROCEDURE — 25010000002 ENOXAPARIN PER 10 MG: Performed by: HOSPITALIST

## 2018-03-04 PROCEDURE — 85027 COMPLETE CBC AUTOMATED: CPT | Performed by: INTERNAL MEDICINE

## 2018-03-04 PROCEDURE — 80048 BASIC METABOLIC PNL TOTAL CA: CPT | Performed by: HOSPITALIST

## 2018-03-04 RX ADMIN — ESCITALOPRAM 10 MG: 10 TABLET, FILM COATED ORAL at 08:29

## 2018-03-04 RX ADMIN — VANCOMYCIN 125 MG: KIT at 23:57

## 2018-03-04 RX ADMIN — CALCIUM 1000 MG: 500 TABLET ORAL at 08:29

## 2018-03-04 RX ADMIN — IPRATROPIUM BROMIDE AND ALBUTEROL SULFATE 3 ML: .5; 3 SOLUTION RESPIRATORY (INHALATION) at 21:52

## 2018-03-04 RX ADMIN — ENOXAPARIN SODIUM 30 MG: 30 INJECTION SUBCUTANEOUS at 08:30

## 2018-03-04 RX ADMIN — VANCOMYCIN 125 MG: KIT at 06:52

## 2018-03-04 RX ADMIN — TAMSULOSIN HYDROCHLORIDE 0.4 MG: 0.4 CAPSULE ORAL at 20:59

## 2018-03-04 RX ADMIN — CHOLESTYRAMINE 1 PACKET: 4 POWDER, FOR SUSPENSION ORAL at 10:25

## 2018-03-04 RX ADMIN — IPRATROPIUM BROMIDE AND ALBUTEROL SULFATE 3 ML: .5; 3 SOLUTION RESPIRATORY (INHALATION) at 17:15

## 2018-03-04 RX ADMIN — VANCOMYCIN 125 MG: KIT at 01:01

## 2018-03-04 RX ADMIN — CEFTRIAXONE SODIUM 1 G: 1 INJECTION, SOLUTION INTRAVENOUS at 01:01

## 2018-03-04 RX ADMIN — FOLIC ACID 1 MG: 1 TABLET ORAL at 08:30

## 2018-03-04 RX ADMIN — VANCOMYCIN 125 MG: KIT at 17:50

## 2018-03-04 RX ADMIN — MIRTAZAPINE 15 MG: 15 TABLET, FILM COATED ORAL at 20:59

## 2018-03-04 RX ADMIN — VANCOMYCIN 125 MG: KIT at 12:10

## 2018-03-04 NOTE — PLAN OF CARE
Problem: Patient Care Overview (Adult)  Goal: Plan of Care Review  Outcome: Ongoing (interventions implemented as appropriate)   03/04/18 0450   Coping/Psychosocial Response Interventions   Plan Of Care Reviewed With patient   Patient Care Overview   Progress no change   Outcome Evaluation   Outcome Summary/Follow up Plan still having loose greenish stools, confused and disoriented, t fell from the bed, found lying on the floor, MD/ House manger/ family informed, sustained bruise on her MARYELLEN ice pack applied, Tylenol given , for further care     Goal: Adult Individualization and Mutuality  Outcome: Ongoing (interventions implemented as appropriate)    Goal: Discharge Needs Assessment  Outcome: Ongoing (interventions implemented as appropriate)      Problem: Fall Risk (Adult)  Goal: Absence of Falls  Outcome: Ongoing (interventions implemented as appropriate)      Problem: Skin Integrity Impairment, Risk/Actual (Adult)  Goal: Skin Integrity/Wound Healing  Outcome: Ongoing (interventions implemented as appropriate)      Problem: Infection, Risk/Actual (Adult)  Goal: Infection Prevention/Resolution  Outcome: Ongoing (interventions implemented as appropriate)      Problem: Nutrition, Imbalanced: Inadequate Oral Intake (Adult)  Goal: Improved Oral Intake  Outcome: Ongoing (interventions implemented as appropriate)    Goal: Prevent Further Weight Loss  Outcome: Ongoing (interventions implemented as appropriate)

## 2018-03-04 NOTE — PLAN OF CARE
Problem: Patient Care Overview (Adult)  Goal: Plan of Care Review  Outcome: Ongoing (interventions implemented as appropriate)   03/04/18 6062   Outcome Evaluation   Outcome Summary/Follow up Plan still having loose stool, slept most of the day , VSS, remain stable, cont. to turn q 2 hours, & enoourage food & drink,     Goal: Adult Individualization and Mutuality  Outcome: Ongoing (interventions implemented as appropriate)    Goal: Discharge Needs Assessment  Outcome: Ongoing (interventions implemented as appropriate)      Problem: Nutrition, Imbalanced: Inadequate Oral Intake (Adult)  Goal: Improved Oral Intake  Outcome: Ongoing (interventions implemented as appropriate)    Goal: Prevent Further Weight Loss  Outcome: Ongoing (interventions implemented as appropriate)

## 2018-03-04 NOTE — SIGNIFICANT NOTE
03/04/18 0912   Rehab Treatment   Discipline physical therapist   Rehab Evaluation   Evaluation Not Performed (Pt is long term care resident with bedhold per CCP, per chart WC bound.  Not appropriate for PT in hospital.  )

## 2018-03-04 NOTE — PROGRESS NOTES
"     LOS: 2 days   Primary Care Physician: Ani Bentley MD     Subjective   Fell yesterday.  Bruised right arm.  She does remember that and can tell me that she fell and injured the right arm.  Does not feel short of breath.  She's not aware that she is wheezing.  She is never smoked.  No orthopnea  3 bowel movements so far today    Vital Signs  Body mass index is 19.64 kg/(m^2).  Temp:  [96.9 °F (36.1 °C)-99.5 °F (37.5 °C)] 99.5 °F (37.5 °C)  Heart Rate:  [60-74] 65  Resp:  [16-20] 18  BP: (148-180)/(50-80) 165/58      Objective:  General Appearance:  In no acute distress (Looks younger than age.  Pleasant and cooperative).    Vital signs: (most recent): Blood pressure 165/58, pulse 65, temperature 99.5 °F (37.5 °C), temperature source Oral, resp. rate 18, height 165.1 cm (65\"), weight 53.5 kg (118 lb), SpO2 94 %.    HEENT: (No JVD.  Neck supple.  No lymphadenopathy.  Trachea midline)    Lungs:  She is not in respiratory distress.  There are wheezes and decreased breath sounds.  No rales or rhonchi.  (Shallow respirations.  No usage of accessory muscles of respiration)  Heart: Normal rate.  Regular rhythm.  No murmur.   Abdomen: Abdomen is soft and non-distended.  Bowel sounds are normal.   There is no abdominal tenderness.   There is no splenomegaly. There is no hepatomegaly.   Extremities: There is no dependent edema.  (Bruising upper inner right arm)  Neurological: Patient is alert.  (Oriented to self only).    Skin:  Warm and dry.  There is ecchymosis.          Results Review:    I reviewed the patient's new clinical results.      Results from last 7 days  Lab Units 03/04/18  0621 03/03/18  0523   WBC 10*3/mm3 6.92 6.64   HEMOGLOBIN g/dL 8.6* 9.6*   PLATELETS 10*3/mm3 271 277       Results from last 7 days  Lab Units 03/04/18  0621 03/03/18  0523   SODIUM mmol/L 143 143   POTASSIUM mmol/L 4.6 4.6   CHLORIDE mmol/L 111* 109*   CO2 mmol/L 23.3 24.2   BUN mg/dL 31* 39*   CREATININE mg/dL 1.20* 1.24* "   CALCIUM mg/dL 8.4 8.3   GLUCOSE mg/dL 82 85         Hemoglobin A1C:No results found for: HGBA1C    Glucose Range:No results found for: POCGLU    Lab Results   Component Value Date    [id number] 404 03/02/2018       Lab Results   Component Value Date    TSH 2.190 03/02/2018       Assessment & Plan      Medication Review: Yes    Active Hospital Problems (** Indicates Principal Problem)    Diagnosis Date Noted   • **C. difficile colitis [A04.72] 03/03/2018   • Folate deficiency anemia [D52.9] 03/04/2018   • CKD (chronic kidney disease), stage III [N18.3] 03/02/2018   • Chronic diarrhea [K52.9] 03/02/2018   • Anemia due to multiple mechanisms [D64.9] 03/02/2018   • Macrocytic anemia [D53.9] 03/02/2018   • Dementia [F03.90] 03/02/2018   • Anemia of chronic renal failure, stage 3 (moderate) [N18.3, D63.1] 07/21/2016   • Iron deficiency anemia due to chronic blood loss [D50.0] 06/10/2016      Resolved Hospital Problems    Diagnosis Date Noted Date Resolved   • Urinary tract infection in elderly patient [N39.0] 03/02/2018 03/04/2018   • Dehydration [E86.0] 03/02/2018 03/04/2018       Assessment/Plan  1.  C. difficile colitis.  Continue oral vancomycin and Questran.  Will need 2 weeks from last dose of Rocephin which was this morning.  2.  Bacteriuria/UTI.  She has completed 3 doses of Rocephin.  3.  Wheezing.  She does not have a history of COPD or asthma.  No evidence of volume excess.  Continue mini nebs.  She may be having some episodes of silent aspiration.  Will ask speech therapy to evaluate tomorrow.  4.  Chronic kidney disease stage III.  Creatinine stable  5.  Anemia with iron deficiency and folate deficiency.  On oral folate.  IV iron given yesterday  6.  History of dementia.  Aricept was stopped secondary to her advanced age and the fact that she is having diarrhea and has a history of chronic diarrhea.      Yane Schwab MD  03/04/18  4:16 PM

## 2018-03-05 LAB
ANION GAP SERPL CALCULATED.3IONS-SCNC: 8.8 MMOL/L
BACTERIA SPEC AEROBE CULT: ABNORMAL
BACTERIA SPEC AEROBE CULT: ABNORMAL
BUN BLD-MCNC: 31 MG/DL (ref 8–23)
BUN/CREAT SERPL: 22.3 (ref 7–25)
CALCIUM SPEC-SCNC: 8.2 MG/DL (ref 8.2–9.6)
CHLORIDE SERPL-SCNC: 110 MMOL/L (ref 98–107)
CO2 SERPL-SCNC: 23.2 MMOL/L (ref 22–29)
CREAT BLD-MCNC: 1.39 MG/DL (ref 0.57–1)
DEPRECATED RDW RBC AUTO: 59.7 FL (ref 37–54)
ERYTHROCYTE [DISTWIDTH] IN BLOOD BY AUTOMATED COUNT: 16.3 % (ref 11.7–13)
GFR SERPL CREATININE-BSD FRML MDRD: 35 ML/MIN/1.73
GLUCOSE BLD-MCNC: 82 MG/DL (ref 65–99)
HCT VFR BLD AUTO: 28.2 % (ref 35.6–45.5)
HGB BLD-MCNC: 8.5 G/DL (ref 11.9–15.5)
MCH RBC QN AUTO: 30 PG (ref 26.9–32)
MCHC RBC AUTO-ENTMCNC: 30.1 G/DL (ref 32.4–36.3)
MCV RBC AUTO: 99.6 FL (ref 80.5–98.2)
PLATELET # BLD AUTO: 249 10*3/MM3 (ref 140–500)
PMV BLD AUTO: 9.8 FL (ref 6–12)
POTASSIUM BLD-SCNC: 4.8 MMOL/L (ref 3.5–5.2)
RBC # BLD AUTO: 2.83 10*6/MM3 (ref 3.9–5.2)
SODIUM BLD-SCNC: 142 MMOL/L (ref 136–145)
WBC NRBC COR # BLD: 6.91 10*3/MM3 (ref 4.5–10.7)

## 2018-03-05 PROCEDURE — 92610 EVALUATE SWALLOWING FUNCTION: CPT

## 2018-03-05 PROCEDURE — 94799 UNLISTED PULMONARY SVC/PX: CPT

## 2018-03-05 PROCEDURE — G8998 SWALLOW D/C STATUS: HCPCS

## 2018-03-05 PROCEDURE — G8996 SWALLOW CURRENT STATUS: HCPCS

## 2018-03-05 PROCEDURE — 25010000002 ENOXAPARIN PER 10 MG: Performed by: HOSPITALIST

## 2018-03-05 PROCEDURE — 85027 COMPLETE CBC AUTOMATED: CPT | Performed by: INTERNAL MEDICINE

## 2018-03-05 PROCEDURE — 80048 BASIC METABOLIC PNL TOTAL CA: CPT | Performed by: HOSPITALIST

## 2018-03-05 PROCEDURE — G8997 SWALLOW GOAL STATUS: HCPCS

## 2018-03-05 PROCEDURE — 25010000002 CEFTRIAXONE PER 250 MG: Performed by: HOSPITALIST

## 2018-03-05 RX ADMIN — CEFTRIAXONE SODIUM 1 G: 1 INJECTION, SOLUTION INTRAVENOUS at 00:02

## 2018-03-05 RX ADMIN — ESCITALOPRAM 10 MG: 10 TABLET, FILM COATED ORAL at 08:24

## 2018-03-05 RX ADMIN — MIRTAZAPINE 15 MG: 15 TABLET, FILM COATED ORAL at 20:35

## 2018-03-05 RX ADMIN — VANCOMYCIN 125 MG: KIT at 12:12

## 2018-03-05 RX ADMIN — ENOXAPARIN SODIUM 30 MG: 30 INJECTION SUBCUTANEOUS at 08:24

## 2018-03-05 RX ADMIN — ACETAMINOPHEN 650 MG: 325 TABLET ORAL at 00:55

## 2018-03-05 RX ADMIN — TAMSULOSIN HYDROCHLORIDE 0.4 MG: 0.4 CAPSULE ORAL at 20:35

## 2018-03-05 RX ADMIN — IPRATROPIUM BROMIDE AND ALBUTEROL SULFATE 3 ML: .5; 3 SOLUTION RESPIRATORY (INHALATION) at 20:14

## 2018-03-05 RX ADMIN — VANCOMYCIN 125 MG: KIT at 17:46

## 2018-03-05 RX ADMIN — CHOLESTYRAMINE 1 PACKET: 4 POWDER, FOR SUSPENSION ORAL at 10:14

## 2018-03-05 RX ADMIN — FOLIC ACID 1 MG: 1 TABLET ORAL at 08:24

## 2018-03-05 RX ADMIN — VANCOMYCIN 125 MG: KIT at 06:55

## 2018-03-05 RX ADMIN — CALCIUM 1000 MG: 500 TABLET ORAL at 08:24

## 2018-03-05 NOTE — PROGRESS NOTES
"    DAILY PROGRESS NOTE  [facility]    Patient Identification:  Name: Yelitza Hernandez  Age: 89 y.o.  Sex: female  :  1925  MRN: [id number]         Primary Care Physician: Ani Bentley MD    Subjective:  Interval History: not voicing any c/o - denies abd pains/n/v - d/w rn and has good PO and improving stool frequency and increasing bulk     Objective:no fm     Scheduled Meds:  calcium carbonate 1,000 mg Oral Daily   cholestyramine 1 packet Oral Daily   enoxaparin 30 mg Subcutaneous Q24H   escitalopram 10 mg Oral Daily   folic acid 1 mg Oral Daily   ipratropium-albuterol 3 mL Nebulization TID - RT   mirtazapine 15 mg Oral Nightly   tamsulosin 0.4 mg Oral Nightly   vancomycin 125 mg Oral Q6H     Continuous Infusions:     Vital signs in last 24 hours:  Temp:  [97.7 °F (36.5 °C)-99.8 °F (37.7 °C)] 97.7 °F (36.5 °C)  Heart Rate:  [64-88] 80  Resp:  [18-20] 18  BP: (140-165)/(58-76) 165/76    Intake/Output:    Intake/Output Summary (Last 24 hours) at 18 1352  Last data filed at 18 0825   Gross per 24 hour   Intake              630 ml   Output                0 ml   Net              630 ml       Exam:  /76 (BP Location: Right arm, Patient Position: Lying)  Pulse 80  Temp 97.7 °F (36.5 °C) (Oral)   Resp 18  Ht 165.1 cm (65\")  Wt 53.5 kg (118 lb)  SpO2 95%  BMI 19.64 kg/m2    General Appearance:    Alert, cooperative, no distress, pleasantly demented                          Throat:   Lips, tongue, gums normal; oral mucosa pink and moist                           Neck:   Supple, symmetrical, trachea midline, no JVD                         Lungs:    Clear to auscultation bilaterally, respirations unlabored                          Heart:    Regular rate and rhythm, S1 and S2 normal                  Abdomen:     Soft, non-tender, bowel sounds active                 Extremities:   Ankle inflatables, no cyanosis or edema                        Pulses:   Pulses palpable in " lower extremities                                 Data Review:  Labs in chart were reviewed.    Assessment:  Active Hospital Problems (** Indicates Principal Problem)    Diagnosis Date Noted   • **C. difficile colitis [A04.72] 03/03/2018   • Folate deficiency anemia [D52.9] 03/04/2018   • CKD (chronic kidney disease), stage III [N18.3] 03/02/2018   • Chronic diarrhea [K52.9] 03/02/2018   • Anemia due to multiple mechanisms [D64.9] 03/02/2018   • Macrocytic anemia [D53.9] 03/02/2018   • Dementia [F03.90] 03/02/2018   • Anemia of chronic renal failure, stage 3 (moderate) [N18.3, D63.1] 07/21/2016   • Iron deficiency anemia due to chronic blood loss [D50.0] 06/10/2016      Resolved Hospital Problems    Diagnosis Date Noted Date Resolved   • Urinary tract infection in elderly patient [N39.0] 03/02/2018 03/04/2018   • Dehydration [E86.0] 03/02/2018 03/04/2018       Plan:  Cdiff colitis - D2/14 Vanc    UTI - treated     ST eval negative    CKD3 - stable     Chronic anemia due to iron/folate deficiency - s/p Venofer and on PO folate     Dementia w/out BD - off Aricept     Dispo - CCP organizing - d/w     Aly Segovia MD  3/5/2018  1:52 PM

## 2018-03-05 NOTE — PROGRESS NOTES
"  Infectious Diseases Progress Note    Livia Dawkins MD     [facility]  [place]: 2 days  Patient Identification:  Name: Yelitza Hernandez  Age: 89 y.o.  Sex: female  :  1925  MRN: [id number]         Primary Care Physician: Ani Bentley MD            Subjective: Less confused, had a fall earlier.  Interval History: See consultation note.     Objective:    Scheduled Meds:  calcium carbonate 1,000 mg Oral Daily   ceftriaxone 1 g Intravenous Q24H   cholestyramine 1 packet Oral Daily   enoxaparin 30 mg Subcutaneous Q24H   escitalopram 10 mg Oral Daily   folic acid 1 mg Oral Daily   ipratropium-albuterol 3 mL Nebulization TID - RT   mirtazapine 15 mg Oral Nightly   tamsulosin 0.4 mg Oral Nightly   vancomycin 125 mg Oral Q6H     Continuous Infusions:     Vital signs in last 24 hours:  Temp:  [97.4 °F (36.3 °C)-99.8 °F (37.7 °C)] 99.8 °F (37.7 °C)  Heart Rate:  [60-88] 88  Resp:  [18-20] 20  BP: (148-168)/(50-68) 154/60    Intake/Output:    Intake/Output Summary (Last 24 hours) at 18 2355  Last data filed at 18 2112   Gross per 24 hour   Intake              780 ml   Output                0 ml   Net              780 ml       Exam:  /60 (BP Location: Right arm, Patient Position: Lying)  Pulse 88  Temp 99.8 °F (37.7 °C) (Oral)   Resp 20  Ht 165.1 cm (65\")  Wt 53.5 kg (118 lb)  SpO2 93%  BMI 19.64 kg/m2    General Appearance:    Confused                          Head:    Normocephalic, without obvious abnormality, atraumatic                           Eyes:    PERRL, conjunctiva/corneas clear, EOM's intact, both eyes                         Throat:   Lips, tongue, gums normal; oral mucosa pink and moist                           Neck:   Supple, symmetrical, trachea midline, no JVD                         Lungs:    Clear to auscultation bilaterally, respirations unlabored                 Chest Wall:    No tenderness or deformity                          Heart:    Regular rate and " rhythm, S1 and S2 normal, no murmur,no  Rub                                      or gallop                  Abdomen:     Soft, non-tender, bowel sounds active, no masses, no                                                        organomegaly                  Extremities:   Extremities normal, atraumatic, no cyanosis or edema                        Pulses:   Pulses palpable in all extremities                            Skin:   Skin is warm and dry,  no rashes or palpable lesions                    Data Review:    I reviewed the patient's new clinical results.    Results from last 7 days  Lab Units 03/04/18 0621 03/03/18 0523 03/02/18 0522 03/01/18  2255   WBC 10*3/mm3 6.92 6.64 7.55 7.32   HEMOGLOBIN g/dL 8.6* 9.6* 9.5* 9.8*   PLATELETS 10*3/mm3 271 277 271 297       Results from last 7 days  Lab Units 03/04/18 0621 03/03/18 0523 03/02/18 0522 03/02/18 0521 03/01/18  2255   SODIUM mmol/L 143 143 140 141 141   POTASSIUM mmol/L 4.6 4.6 4.7 4.8 4.8   CHLORIDE mmol/L 111* 109* 107 107 106   CO2 mmol/L 23.3 24.2 24.1 24.4 26.3   BUN mg/dL 31* 39* 50* 47* 57*   CREATININE mg/dL 1.20* 1.24* 1.43* 1.44* 1.64*   CALCIUM mg/dL 8.4 8.3 8.5 8.6 8.6   GLUCOSE mg/dL 82 85 92 93 95         Assessment:  Principal Problem:    C. difficile colitis  Active Problems:    Iron deficiency anemia due to chronic blood loss    Anemia of chronic renal failure, stage 3 (moderate)    CKD (chronic kidney disease), stage III    Chronic diarrhea    Anemia due to multiple mechanisms    Macrocytic anemia    Dementia    Folate deficiency anemia      Plan:  Continue with Oral vancomycin after last dose of Rocephin tonight.    Livia Dawkins MD  3/4/2018  11:55 PM    Much of this encounter note is an electronic transcription/translation of spoken language to printed text. The electronic translation of spoken language may permit erroneous, or at times, nonsensical words or phrases to be inadvertently transcribed; Although I have reviewed the note  for such errors, some may still exist

## 2018-03-05 NOTE — THERAPY EVALUATION
Acute Care - Speech Language Pathology   Swallow Initial Evaluation [facility]     Patient Name: Yelitza Hernandez  : 1925  MRN: [id number]  Today's Date: 3/5/2018               Admit Date: 3/1/2018    Visit Dx:     ICD-10-CM ICD-9-CM   1. Urinary tract infection in elderly patient N39.0 599.0   2. Acute kidney injury N17.9 584.9     Patient Active Problem List   Diagnosis   • Iron deficiency anemia due to chronic blood loss   • CKD (chronic kidney disease)   • Rectal ulcer   • Anemia of chronic renal failure, stage 3 (moderate)   • CKD (chronic kidney disease), stage III   • Chronic diarrhea   • Anemia due to multiple mechanisms   • Macrocytic anemia   • Dementia   • C. difficile colitis   • Folate deficiency anemia     Past Medical History:   Diagnosis Date   • Anemia     Iron deficiency    • Anxiety    • Chronic kidney disease    • Coronary artery disease    • Dementia    • GERD (gastroesophageal reflux disease)    • Hypertension    • Renal disorder     stage 3     History reviewed. No pertinent surgical history.    SPEECH-LANGUAGE PATHOLOGY EVALUATION - SWALLOW  Subjective: The patient was seen on this date for a Clinical Swallow evaluation.  Patient was alert and cooperative.  Objective: Textures given included thin liquid, nectar thick liquid, puree consistency, mechanical soft consistency and regular consistency.  Assessment: Difficulties were noted with none of the above consistencies. Extra time needed at times.  SLP Findings:  Patient presents with minimal oropharyngeal dysphagia, without esophageal component.   Recommendations: Diet Textures: thin liquid, regular consistency food.  Medications should be taken whole with thin liquids or puree.  Recommended Strategies: Upright for PO, small bites and sips and may use straw. Oral care before breakfast, after all meals and PRN.  Other Recommended Evaluations: Monitor for diet tolerance and need for a possible VFSS if overt or clinical s/s of aspiration  are noted.   Dysphagia therapy is recommended. Rationale: Safety on least restrictive oral diet.         SWALLOW EVALUATION (last 72 hours)      Swallow Evaluation       03/05/18 1000                Rehab Evaluation    Document Type evaluation  -NR        Subjective Information no complaints;agree to therapy  -NR        Patient Effort, Rehab Treatment good  -NR        Symptoms Noted During/After Treatment none  -NR        General Information    Patient Profile Review yes  -NR        Prior Level of Function- Swallowing safe, efficient swallowing in all situations  -NR        Plans/Goals Discussed With patient;agreed upon  -NR        Barriers to Rehab none identified  -NR        Clinical Impression    Patient's Goals For Discharge patient did not state  -NR        Rehab Potential/Prognosis, Swallowing good, to achieve stated therapy goals  -NR        Criteria for Skilled Therapeutic Interventions Met demonstrates skilled criteria for intervention  -NR        Therapy Frequency PRN  -NR        Predicted Duration Therapy Interv (days) until discharge  -NR        Pain Assessment    Pain Assessment No/denies pain  -NR        Cognitive Assessment/Intervention    Current Cognitive/Communication Assessment --   dementia per chart  -NR        Oral Motor Structure and Function    Oral Motor Anatomy and Physiology patient demonstrates anatomy and physiology that is WNL  -NR        Dentition Assessment present and adequate  -NR        Oral Musculature General Assessment WFL (within functional limits)  -NR        Dysphagia Treatment Objectives and Progress    Dysphagia Treatment Objectives Other 1  -NR        Dysphagia Other 1    Dysphagia Other 1 Objective --   Tolerate diet without overt s/s of aspiration.  -NR        Status: Dysphagia Other 1 New  -NR          User Key  (r) = Recorded By, (t) = Taken By, (c) = Cosigned By    Initials Name Effective Dates    NR Angelica Burgos MA,CCC-SLP 04/13/15 -         EDUCATION  The patient  has been educated in the following areas:   Dysphagia (Swallowing Impairment).    SLP Recommendation and Plan                    Criteria for Skilled Therapeutic Interventions Met: demonstrates skilled criteria for intervention     Rehab Potential/Prognosis, Swallowing: good, to achieve stated therapy goals  Therapy Frequency: PRN                        IP SLP Goals       03/05/18 1013          Safely Consume Diet    Safely Consume Diet- SLP, Time to Achieve by discharge  -NR      Safely Consume Diet- SLP, Additional Goal Tolerate diet without overt or clinical s/s of aspiration  -NR        User Key  (r) = Recorded By, (t) = Taken By, (c) = Cosigned By    Initials Name Provider Type    NR Angelica Burgos MA,CCC-SLP Speech and Language Pathologist             SLP Outcome Measures (last 72 hours)      SLP Outcome Measures       03/05/18 1000          SLP Outcome Measures    Outcome Measure Used? Adult NOMS  -NR      FCM Scores    Swallowing FCM Score 6  -NR        User Key  (r) = Recorded By, (t) = Taken By, (c) = Cosigned By    Initials Name Effective Dates    NR Angelica Burgos MA,CCC-SLP 04/13/15 -            Time Calculation:         Time Calculation- SLP       03/05/18 1015          Time Calculation- SLP    SLP Start Time 0915  -NR      SLP Stop Time 1000  -NR      SLP Time Calculation (min) 45 min  -NR      SLP Received On 03/05/18  -NR        User Key  (r) = Recorded By, (t) = Taken By, (c) = Cosigned By    Initials Name Provider Type    NR Angelica Burgos MA,CCC-SLP Speech and Language Pathologist          Therapy Charges for Today     Code Description Service Date Service Provider Modifiers Qty    13321413830 HC ST SWALLOWING CURRENT STATUS 3/5/2018 Angelica Burgos MA,CCC-SLP GN, CI 1    72643498193 HC ST SWALLOWING PROJECTED 3/5/2018 Angelica Burgos MA,CCC-SLP GN,  1    92784812674 HC ST SWALLOWING DISCHARGE 3/5/2018 Angelica Burgos MA,CCC-SLP GN, CI 1    82787731898 HC ST EVAL ORAL PHARYNG SWALLOW 3 3/5/2018 Angelica  COMPA Burgos,CCC-SLP GN 1          SLP G-Codes  SLP NOMS Used?: Yes  Functional Limitations: Swallowing  Swallow Current Status (): At least 1 percent but less than 20 percent impaired, limited or restricted  Swallow Goal Status (): 0 percent impaired, limited or restricted  Swallow Discharge Status (): At least 1 percent but less than 20 percent impaired, limited or restricted    Angelica Burgos MA,CCC-SLP  3/5/2018

## 2018-03-05 NOTE — PLAN OF CARE
Problem: Patient Care Overview (Adult)  Goal: Plan of Care Review  Outcome: Ongoing (interventions implemented as appropriate)   03/05/18 0531   Coping/Psychosocial Response Interventions   Plan Of Care Reviewed With patient   Patient Care Overview   Progress no change   Outcome Evaluation   Outcome Summary/Follow up Plan awake most of the time, still with frequent loose stools, tried to get out of the bed several times, disoriented, on bed alarm/ falls precaution, turned to sides every 2 hours, mepilex to coccyx dry and intact gluteal area red still blanchable, labia swollen and excoriated, barrieir cream applied, for further care     Goal: Adult Individualization and Mutuality  Outcome: Ongoing (interventions implemented as appropriate)    Goal: Discharge Needs Assessment  Outcome: Ongoing (interventions implemented as appropriate)      Problem: Fall Risk (Adult)  Goal: Absence of Falls  Outcome: Ongoing (interventions implemented as appropriate)      Problem: Skin Integrity Impairment, Risk/Actual (Adult)  Goal: Identify Related Risk Factors and Signs and Symptoms  Outcome: Outcome(s) achieved Date Met: 03/05/18    Goal: Skin Integrity/Wound Healing  Outcome: Ongoing (interventions implemented as appropriate)      Problem: Infection, Risk/Actual (Adult)  Goal: Infection Prevention/Resolution  Outcome: Ongoing (interventions implemented as appropriate)      Problem: Nutrition, Imbalanced: Inadequate Oral Intake (Adult)  Goal: Improved Oral Intake  Outcome: Ongoing (interventions implemented as appropriate)    Goal: Prevent Further Weight Loss  Outcome: Outcome(s) achieved Date Met: 03/05/18

## 2018-03-05 NOTE — PLAN OF CARE
Problem: Inpatient SLP  Goal: Dysphagia- Patient will safely consume diet as per recommendation with no signs/symptoms of aspiration   03/05/18 1013   Safely Consume Diet   Safely Consume Diet- SLP, Time to Achieve by discharge   Safely Consume Diet- SLP, Additional Goal Tolerate diet without overt or clinical s/s of aspiration

## 2018-03-05 NOTE — PROGRESS NOTES
Discharge Planning Assessment  [facility]     Patient Name: Yelitza Hernandez  MRN: [id number]  Today's Date: 3/5/2018    Admit Date: 3/1/2018          Discharge Needs Assessment     None            Discharge Plan       03/05/18 1346    Case Management/Social Work Plan    Plan [facility]/ Has Medicaid bedhold and can return Skilled if needed     Additional Comments Message received from son Haroon Hernandez he is agreeable with the discharge plan for patient to return to [facility] at discharge. Call placed to Stephanie/[place] for [facility] patient can return skilled care if needed. Yamileth De La Torre RN        Discharge Placement     Facility/Agency Request Status Selected? Address Phone Number Fax Number    [facility] Pending - Request Sent     [address] [facility] [address] [phone]                Demographic Summary     None            Functional Status     None            Psychosocial     None            Abuse/Neglect     None            Legal     None            Substance Abuse     None            Patient Forms     None          Yamileth De La Torre RN

## 2018-03-05 NOTE — PLAN OF CARE
Problem: Patient Care Overview (Adult)  Goal: Plan of Care Review  Outcome: Ongoing (interventions implemented as appropriate)   03/05/18 1601   Coping/Psychosocial Response Interventions   Plan Of Care Reviewed With patient   Patient Care Overview   Progress improving   Outcome Evaluation   Outcome Summary/Follow up Plan courtney po food/drink, no emesis/nausea, x2 forming stools, turn q 2hr, tried to get out of bed a couple of times with bed alarms on, labia swollen and excoriated with barrier cream applied, vitals stable     Goal: Adult Individualization and Mutuality  Outcome: Ongoing (interventions implemented as appropriate)    Goal: Discharge Needs Assessment  Outcome: Ongoing (interventions implemented as appropriate)      Problem: Fall Risk (Adult)  Goal: Absence of Falls  Outcome: Ongoing (interventions implemented as appropriate)      Problem: Skin Integrity Impairment, Risk/Actual (Adult)  Goal: Skin Integrity/Wound Healing  Outcome: Ongoing (interventions implemented as appropriate)      Problem: Infection, Risk/Actual (Adult)  Goal: Infection Prevention/Resolution  Outcome: Ongoing (interventions implemented as appropriate)      Problem: Nutrition, Imbalanced: Inadequate Oral Intake (Adult)  Goal: Improved Oral Intake  Outcome: Ongoing (interventions implemented as appropriate)

## 2018-03-05 NOTE — NURSING NOTE
I was passing Pt's room 698 and heard moaning, came in to check Pt, but did not see her on the bed, I walked towards the bathroom thinking CNA took the Pt to the bathroom and needed help. Instead saw Pt lying on the floor moaning, apparently Pt fell from the bed. The Pt reported she wanted to go to the bathroom and got out of bed. Pt is confused and disoriented with history of dementia and does not use call light. Bed alarm was not activated so it did not go off. I called my CNA and charge nurse and we helped Pt back to bed with the Liko lift, did further assessment, did pericare. No other injury noted except for the bruise on her MARYELLEN. , Pt's son and Dr Schwab was informed of the incident. Dr Schwab did not order any X rays, advised to give Tylenol and apply ice pack to MARYELLEN. Kept under observation for any changes in LOC or any new bruising. Bed alarm activated, and  bed alarm with the strip added. Hourly round continued.

## 2018-03-06 VITALS
HEART RATE: 75 BPM | RESPIRATION RATE: 20 BRPM | WEIGHT: 118 LBS | BODY MASS INDEX: 19.66 KG/M2 | OXYGEN SATURATION: 94 % | SYSTOLIC BLOOD PRESSURE: 140 MMHG | HEIGHT: 65 IN | DIASTOLIC BLOOD PRESSURE: 81 MMHG | TEMPERATURE: 97.5 F

## 2018-03-06 PROCEDURE — 94799 UNLISTED PULMONARY SVC/PX: CPT

## 2018-03-06 PROCEDURE — 25010000002 ENOXAPARIN PER 10 MG: Performed by: HOSPITALIST

## 2018-03-06 RX ORDER — FOLIC ACID 1 MG/1
1 TABLET ORAL DAILY
Start: 2018-03-07

## 2018-03-06 RX ADMIN — ENOXAPARIN SODIUM 30 MG: 30 INJECTION SUBCUTANEOUS at 09:07

## 2018-03-06 RX ADMIN — FOLIC ACID 1 MG: 1 TABLET ORAL at 09:07

## 2018-03-06 RX ADMIN — IPRATROPIUM BROMIDE AND ALBUTEROL SULFATE 3 ML: .5; 3 SOLUTION RESPIRATORY (INHALATION) at 12:30

## 2018-03-06 RX ADMIN — CHOLESTYRAMINE 1 PACKET: 4 POWDER, FOR SUSPENSION ORAL at 13:00

## 2018-03-06 RX ADMIN — VANCOMYCIN 125 MG: KIT at 11:47

## 2018-03-06 RX ADMIN — VANCOMYCIN 125 MG: KIT at 05:36

## 2018-03-06 RX ADMIN — VANCOMYCIN 125 MG: KIT at 00:37

## 2018-03-06 RX ADMIN — CALCIUM 1000 MG: 500 TABLET ORAL at 09:07

## 2018-03-06 RX ADMIN — VANCOMYCIN 125 MG: KIT at 17:04

## 2018-03-06 RX ADMIN — IPRATROPIUM BROMIDE AND ALBUTEROL SULFATE 3 ML: .5; 3 SOLUTION RESPIRATORY (INHALATION) at 05:58

## 2018-03-06 RX ADMIN — ESCITALOPRAM 10 MG: 10 TABLET, FILM COATED ORAL at 09:07

## 2018-03-06 NOTE — DISCHARGE SUMMARY
[facility]    Date of Discharge:  3/6/2018    PCP: Ani Bentley MD    Discharge Diagnosis:   Active Hospital Problems (** Indicates Principal Problem)    Diagnosis Date Noted   • **C. difficile colitis [A04.72] 03/03/2018   • Folate deficiency anemia [D52.9] 03/04/2018   • CKD (chronic kidney disease), stage III [N18.3] 03/02/2018   • Chronic diarrhea [K52.9] 03/02/2018   • Anemia due to multiple mechanisms [D64.9] 03/02/2018   • Macrocytic anemia [D53.9] 03/02/2018   • Dementia [F03.90] 03/02/2018   • Anemia of chronic renal failure, stage 3 (moderate) [N18.3, D63.1] 07/21/2016   • Iron deficiency anemia due to chronic blood loss [D50.0] 06/10/2016      Resolved Hospital Problems    Diagnosis Date Noted Date Resolved   • Urinary tract infection in elderly patient [N39.0] 03/02/2018 03/04/2018   • Dehydration [E86.0] 03/02/2018 03/04/2018     Procedures Performed       Consulting Physician(s)     Provider Relationship Specialty    Livia Dawkins MD Consulting Physician Infectious Diseases        Hospital Course  Please see history and physical for details. Patient is a 89 y.o. female initially sent over for abnormal lab work so history was limited secondary to her dementia at admission.  Also when patient was found to have a urinary tract infection in which she grew a multidrug resistant ESBL Escherichia coli.  Secondary to the above infectious disease was consulted and an antibiotics were not switched over to Invanz.  Her strain was actually sensitive to Rocephin and the patient received a total of 4 doses at 1 g each.  Patient was maintained on by mouth vancomycin.  At this juncture she is day 3 of 14 in regards to antibody regimen with vancomycin.  Speech did evaluate while here in the evaluation was negative and were not demonstrating any aspects of dysphagia.  Her CKD stage III is remained stable.  She does have issues with chronic anemia due to  iron deficiency as well as folate deficiency and chronic kidney disease and has received IV iron as well as by mouth folate while here.  Patient's dementia without behavior disorder has been fairly stable.  We have removed Aricept secondary to increased risk of diarrhea.  I've also discontinued Imodium as well as Protonix and if patient experiences further issues with GERD or reflux and I will suggest use of Pepcid versus other H2 blocker.  At this juncture patient is medically stable for transition to have.  CCP is helped organize all disposition needs.  Her stool frequency is still fairly frequent but her amount of stool has improved on a daily basis.  I will continue use of Questran on a daily basis and I see that her previous home med rec noted she was on it 3 times daily and ultimately defer further use of Questran to the accepting M.D. at her rehabilitation facility but I would be cautious about overuse as to not prevent vancomycin absorption and drug efficacy.  All questions are been answered to patient prior to disposition and unfortunately have not met any family at bedside on this brief hospital stay.    Condition on Discharge: Improved.     Temp:  [97.2 °F (36.2 °C)-99.4 °F (37.4 °C)] 97.2 °F (36.2 °C)  Heart Rate:  [62-86] 77  Resp:  [16-24] 18  BP: (131-174)/(62-79) 131/62  Body mass index is 19.64 kg/(m^2).    Physical Exam   Constitutional: She appears well-developed.   Pleasantly demented.  Currently eating lunch   Neck: Neck supple. No JVD present.   Cardiovascular: Normal rate and regular rhythm.    Pulmonary/Chest: Breath sounds normal. No respiratory distress.   Abdominal: Soft. Bowel sounds are normal. She exhibits no distension. There is no tenderness. There is no rebound and no guarding.   Musculoskeletal: She exhibits no edema.   Neurological: She is alert.     Discharge Medications   Yelitza Hernandez   Home Medication Instructions PAUL:[id number]    Printed on:03/06/18 1230   Medication  Information                      acetaminophen (TYLENOL) 325 MG tablet  Take 650 mg by mouth Every 6 (Six) Hours As Needed for Mild Pain .             calcium carbonate (OS-HERB) 600 MG tablet  Take 600 mg by mouth Daily.             escitalopram (LEXAPRO) 20 MG tablet  Take 10 mg by mouth.             ferrous sulfate 325 (65 FE) MG tablet  Take 325 mg by mouth Daily With Breakfast.             folic acid (FOLVITE) 1 MG tablet  Take 1 tablet by mouth Daily.             ipratropium-albuterol (DUONEB) 0.5-2.5 mg/mL nebulizer  Take 3 mL by nebulization Every 4 (Four) Hours As Needed for Wheezing.             mirtazapine (REMERON) 15 MG tablet  Take 15 mg by mouth.             tamsulosin (FLOMAX) 0.4 MG capsule 24 hr capsule  Take 1 capsule by mouth Every Night.             vancomycin (VANCOCIN HCL) 125 MG capsule  Take 125 mg by mouth 4 (Four) Times a Day.                  Additional Instructions for the Follow-ups that You Need to Schedule     Discharge Follow-up with PCP    As directed    Follow Up Details:  pcp upon return to facility - ID per their recs                 Follow-up Information     Follow up with [facility] .    Specialties:  Skilled Nursing Facility, Intermediate Care Facility    Contact information:    [address] [facility] [address] [phone]        Follow up with Ani Bentley MD .    Specialty:  Family Medicine    Why:  pcp upon return to facility - ID per their recs    Contact information:    [address] [address] [phone]          Follow up with Carina Holder MD .    Specialty:  Nephrology    Why:  pcp upon return to facility - ID per their recs    Contact information:    [address] [facility] [id number] [phone]          Test Results Pending at Discharge     Aly Segovia MD  03/06/18  12:30 PM    Discharge time spent greater than 30 minutes.

## 2018-03-06 NOTE — PLAN OF CARE
Problem: Patient Care Overview (Adult)  Goal: Plan of Care Review  Outcome: Ongoing (interventions implemented as appropriate)   03/06/18 0348   Coping/Psychosocial Response Interventions   Plan Of Care Reviewed With patient   Patient Care Overview   Progress no change   Outcome Evaluation   Outcome Summary/Follow up Plan VSS. No signs of pain. BM X3 , soft. Perineal area excoriated. Barrier cream applied. Confused at times. Attempted to get out of bed few times. Falls precaution continued.      Goal: Adult Individualization and Mutuality  Outcome: Ongoing (interventions implemented as appropriate)    Goal: Discharge Needs Assessment  Outcome: Ongoing (interventions implemented as appropriate)      Problem: Fall Risk (Adult)  Goal: Absence of Falls  Outcome: Ongoing (interventions implemented as appropriate)      Problem: Skin Integrity Impairment, Risk/Actual (Adult)  Goal: Skin Integrity/Wound Healing  Outcome: Ongoing (interventions implemented as appropriate)      Problem: Infection, Risk/Actual (Adult)  Goal: Infection Prevention/Resolution  Outcome: Ongoing (interventions implemented as appropriate)      Problem: Nutrition, Imbalanced: Inadequate Oral Intake (Adult)  Goal: Improved Oral Intake  Outcome: Ongoing (interventions implemented as appropriate)

## 2018-03-06 NOTE — PROGRESS NOTES
Continued Stay Note  [facility]     Patient Name: Yelitza Hernandez  MRN: [id number]  Today's Date: 3/6/2018    Admit Date: 3/1/2018          Discharge Plan       03/06/18 1254    Case Management/Social Work Plan    Plan Return to [facility]    Additional Comments Pt with discharge order, Pt's son Haroon Hernandez [phone] notified of discharge and (IMM Letter) right to appeal discharge, he declined the offer of number to appeal discharge, I advised 7 PM ambulance arranged (he said pt would need ambulance transport). Discharge clinical faxed to [facility]. Stephanie with [facility] notifed of discharge and transport time, she said pt will return to her Medicaid Bed / IC level of care.         Penny Fountain RN    Final Note    Final Note [facility] IC level via [facility]              Discharge Codes       03/06/18 1257    Discharge Codes    Discharge Codes 04  Discharged/transferred to intermediate care facility (ICF)        Expected Discharge Date and Time     Expected Discharge Date Expected Discharge Time    Mar 6, 2018             Penny Fountain, RN

## 2018-06-09 ENCOUNTER — LAB REQUISITION (OUTPATIENT)
Dept: LAB | Facility: HOSPITAL | Age: 83
End: 2018-06-09

## 2018-06-09 DIAGNOSIS — Z00.00 ROUTINE GENERAL MEDICAL EXAMINATION AT A HEALTH CARE FACILITY: ICD-10-CM

## 2018-06-09 LAB
BACTERIA UR QL AUTO: ABNORMAL /HPF
BILIRUB UR QL STRIP: NEGATIVE
CLARITY UR: ABNORMAL
COLOR UR: YELLOW
GLUCOSE UR STRIP-MCNC: NEGATIVE MG/DL
HGB UR QL STRIP.AUTO: ABNORMAL
HYALINE CASTS UR QL AUTO: ABNORMAL /LPF
KETONES UR QL STRIP: NEGATIVE
LEUKOCYTE ESTERASE UR QL STRIP.AUTO: ABNORMAL
NITRITE UR QL STRIP: NEGATIVE
PH UR STRIP.AUTO: 6.5 [PH] (ref 5–8)
PROT UR QL STRIP: ABNORMAL
RBC # UR: ABNORMAL /HPF
REF LAB TEST METHOD: ABNORMAL
SP GR UR STRIP: >=1.03 (ref 1–1.03)
SQUAMOUS #/AREA URNS HPF: ABNORMAL /HPF
UROBILINOGEN UR QL STRIP: ABNORMAL
WBC UR QL AUTO: ABNORMAL /HPF

## 2018-06-09 PROCEDURE — 81001 URINALYSIS AUTO W/SCOPE: CPT

## 2018-06-15 ENCOUNTER — APPOINTMENT (OUTPATIENT)
Dept: GENERAL RADIOLOGY | Facility: HOSPITAL | Age: 83
End: 2018-06-15

## 2018-06-15 ENCOUNTER — HOSPITAL ENCOUNTER (EMERGENCY)
Facility: HOSPITAL | Age: 83
Discharge: HOME | End: 2018-06-15
Attending: EMERGENCY MEDICINE | Admitting: EMERGENCY MEDICINE

## 2018-06-15 VITALS
SYSTOLIC BLOOD PRESSURE: 145 MMHG | RESPIRATION RATE: 18 BRPM | HEART RATE: 75 BPM | DIASTOLIC BLOOD PRESSURE: 71 MMHG | BODY MASS INDEX: 20.99 KG/M2 | TEMPERATURE: 98.1 F | HEIGHT: 65 IN | OXYGEN SATURATION: 93 % | WEIGHT: 126 LBS

## 2018-06-15 DIAGNOSIS — F03.90 DEMENTIA WITHOUT BEHAVIORAL DISTURBANCE, UNSPECIFIED DEMENTIA TYPE: Primary | ICD-10-CM

## 2018-06-15 DIAGNOSIS — R06.00 DYSPNEA, UNSPECIFIED TYPE: ICD-10-CM

## 2018-06-15 LAB
ALBUMIN SERPL-MCNC: 3.1 G/DL (ref 3.5–5.2)
ALBUMIN/GLOB SERPL: 0.7 G/DL
ALP SERPL-CCNC: 81 U/L (ref 39–117)
ALT SERPL W P-5'-P-CCNC: 8 U/L (ref 1–33)
ANION GAP SERPL CALCULATED.3IONS-SCNC: 14.5 MMOL/L
AST SERPL-CCNC: 17 U/L (ref 1–32)
BASOPHILS # BLD AUTO: 0.05 10*3/MM3 (ref 0–0.2)
BASOPHILS NFR BLD AUTO: 0.4 % (ref 0–1.5)
BILIRUB SERPL-MCNC: 0.2 MG/DL (ref 0.1–1.2)
BUN BLD-MCNC: 32 MG/DL (ref 8–23)
BUN/CREAT SERPL: 19.6 (ref 7–25)
CALCIUM SPEC-SCNC: 8.7 MG/DL (ref 8.2–9.6)
CHLORIDE SERPL-SCNC: 101 MMOL/L (ref 98–107)
CO2 SERPL-SCNC: 24.5 MMOL/L (ref 22–29)
CREAT BLD-MCNC: 1.63 MG/DL (ref 0.57–1)
DEPRECATED RDW RBC AUTO: 54.6 FL (ref 37–54)
EOSINOPHIL # BLD AUTO: 0.14 10*3/MM3 (ref 0–0.7)
EOSINOPHIL NFR BLD AUTO: 1.2 % (ref 0.3–6.2)
ERYTHROCYTE [DISTWIDTH] IN BLOOD BY AUTOMATED COUNT: 15 % (ref 11.7–13)
GFR SERPL CREATININE-BSD FRML MDRD: 30 ML/MIN/1.73
GLOBULIN UR ELPH-MCNC: 4.3 GM/DL
GLUCOSE BLD-MCNC: 136 MG/DL (ref 65–99)
HCT VFR BLD AUTO: 34.9 % (ref 35.6–45.5)
HGB BLD-MCNC: 10.4 G/DL (ref 11.9–15.5)
IMM GRANULOCYTES # BLD: 0.02 10*3/MM3 (ref 0–0.03)
IMM GRANULOCYTES NFR BLD: 0.2 % (ref 0–0.5)
LYMPHOCYTES # BLD AUTO: 2.76 10*3/MM3 (ref 0.9–4.8)
LYMPHOCYTES NFR BLD AUTO: 23.7 % (ref 19.6–45.3)
MCH RBC QN AUTO: 29.4 PG (ref 26.9–32)
MCHC RBC AUTO-ENTMCNC: 29.8 G/DL (ref 32.4–36.3)
MCV RBC AUTO: 98.6 FL (ref 80.5–98.2)
MONOCYTES # BLD AUTO: 1.1 10*3/MM3 (ref 0.2–1.2)
MONOCYTES NFR BLD AUTO: 9.4 % (ref 5–12)
NEUTROPHILS # BLD AUTO: 7.6 10*3/MM3 (ref 1.9–8.1)
NEUTROPHILS NFR BLD AUTO: 65.3 % (ref 42.7–76)
NRBC BLD MANUAL-RTO: 0 /100 WBC (ref 0–0)
NT-PROBNP SERPL-MCNC: 732 PG/ML (ref 0–1800)
PLATELET # BLD AUTO: 371 10*3/MM3 (ref 140–500)
PMV BLD AUTO: 10.6 FL (ref 6–12)
POTASSIUM BLD-SCNC: 4.7 MMOL/L (ref 3.5–5.2)
PROT SERPL-MCNC: 7.4 G/DL (ref 6–8.5)
RBC # BLD AUTO: 3.54 10*6/MM3 (ref 3.9–5.2)
SODIUM BLD-SCNC: 140 MMOL/L (ref 136–145)
TROPONIN T SERPL-MCNC: <0.01 NG/ML (ref 0–0.03)
WBC NRBC COR # BLD: 11.65 10*3/MM3 (ref 4.5–10.7)

## 2018-06-15 PROCEDURE — 93010 ELECTROCARDIOGRAM REPORT: CPT | Performed by: INTERNAL MEDICINE

## 2018-06-15 PROCEDURE — 93005 ELECTROCARDIOGRAM TRACING: CPT | Performed by: EMERGENCY MEDICINE

## 2018-06-15 PROCEDURE — 83880 ASSAY OF NATRIURETIC PEPTIDE: CPT | Performed by: EMERGENCY MEDICINE

## 2018-06-15 PROCEDURE — 71045 X-RAY EXAM CHEST 1 VIEW: CPT

## 2018-06-15 PROCEDURE — 80053 COMPREHEN METABOLIC PANEL: CPT | Performed by: EMERGENCY MEDICINE

## 2018-06-15 PROCEDURE — 99284 EMERGENCY DEPT VISIT MOD MDM: CPT

## 2018-06-15 PROCEDURE — 84484 ASSAY OF TROPONIN QUANT: CPT | Performed by: EMERGENCY MEDICINE

## 2018-06-15 PROCEDURE — 85025 COMPLETE CBC W/AUTO DIFF WBC: CPT | Performed by: EMERGENCY MEDICINE

## 2018-06-15 NOTE — ED PROVIDER NOTES
EMERGENCY DEPARTMENT ENCOUNTER    CHIEF COMPLAINT  Chief Complaint: Unknown, sent from nursing home for evaluation of SOA and questionable black stool.   History given by: Pt  History limited by: dementia  Room Number: 19/19  PMD: Ani Bentley MD      HPI:  Pt is a 89 y.o. female who presents with no specific complaint. She was sent here from nursing home for evaluation of SOA and questionable black stool or even possibly hematemesis-the nursing home report is not quite clear.  She is pleasantly confused and offers no complaints.  Pt denies SOA, CP, or abd pain.    Duration:  unknown  Onset: unknown  Timing: unknown  Location: unknown  Radiation: unknown  Quality: unknown  Intensity/Severity: unknown  Progression: unwknown  Associated Symptoms: unknown  Aggravating Factors: unknown  Alleviating Factors: unknown  Previous Episodes: unknown  Treatment before arrival: unknown    PAST MEDICAL HISTORY  Active Ambulatory Problems     Diagnosis Date Noted   • Iron deficiency anemia due to chronic blood loss 06/10/2016   • CKD (chronic kidney disease) 06/10/2016   • Rectal ulcer 06/10/2016   • Anemia of chronic renal failure, stage 3 (moderate) 07/21/2016   • CKD (chronic kidney disease), stage III 03/02/2018   • Chronic diarrhea 03/02/2018   • Anemia due to multiple mechanisms 03/02/2018   • Macrocytic anemia 03/02/2018   • Dementia 03/02/2018   • C. difficile colitis 03/03/2018   • Folate deficiency anemia 03/04/2018     Resolved Ambulatory Problems     Diagnosis Date Noted   • Urinary tract infection in elderly patient 03/02/2018   • Dehydration 03/02/2018     Past Medical History:   Diagnosis Date   • Anemia    • Anxiety    • Chronic kidney disease    • Coronary artery disease    • Dementia    • GERD (gastroesophageal reflux disease)    • Hypertension    • Renal disorder        PAST SURGICAL HISTORY  History reviewed. No pertinent surgical history.    FAMILY HISTORY  History reviewed. No pertinent family  history.    SOCIAL HISTORY  Social History     Social History   • Marital status:      Spouse name: N/A   • Number of children: N/A   • Years of education: N/A     Occupational History   • Not on file.     Social History Main Topics   • Smoking status: Never Smoker   • Smokeless tobacco: Never Used   • Alcohol use No   • Drug use: No   • Sexual activity: Not on file     Other Topics Concern   • Not on file     Social History Narrative   • No narrative on file       ALLERGIES  Niacin    REVIEW OF SYSTEMS  Review of Systems   Unable to perform ROS: Dementia       PHYSICAL EXAM  ED Triage Vitals [06/15/18 0026]   Temp Heart Rate Resp BP SpO2   97.5 °F (36.4 °C) 91 18 103/73 95 %      Temp src Heart Rate Source Patient Position BP Location FiO2 (%)   Tympanic Monitor -- -- --       Physical Exam   Constitutional: No distress.   Pleasantly demented   HENT:   Head: Normocephalic and atraumatic.   Eyes: EOM are normal. Pupils are equal, round, and reactive to light.   Neck: Normal range of motion. Neck supple.   Cardiovascular: Normal rate, regular rhythm and normal heart sounds.    Pulmonary/Chest: Effort normal and breath sounds normal. No respiratory distress.   Abdominal: Soft. There is no tenderness. There is no rebound and no guarding.   Musculoskeletal: Normal range of motion. She exhibits no edema.   Neurological: She is alert. She has normal sensation and normal strength.   Disoriented to place and time   Skin: Skin is warm and dry. No rash noted.   Psychiatric: Mood and affect normal.   Nursing note and vitals reviewed.      LAB RESULTS  Lab Results (last 24 hours)     Procedure Component Value Units Date/Time    CBC & Differential [[id number]] Collected:  06/15/18 0057    Specimen:  Blood Updated:  06/15/18 0127    Narrative:       The following orders were created for panel order CBC & Differential.  Procedure                               Abnormality         Status                     ---------                                -----------         ------                     CBC Auto Differential[[id number]]        Abnormal            Final result                 Please view results for these tests on the individual orders.    BNP [[id number]]  (Normal) Collected:  06/15/18 0057    Specimen:  Blood Updated:  06/15/18 0132     proBNP 732.0 pg/mL     Narrative:       Among patients with dyspnea, NT-proBNP is highly sensitive for the detection of acute congestive heart failure. In addition NT-proBNP of <300 pg/ml effectively rules out acute congestive heart failure with 99% negative predictive value.    Troponin [[id number]]  (Normal) Collected:  06/15/18 0057    Specimen:  Blood Updated:  06/15/18 0132     Troponin T <0.010 ng/mL     Narrative:       Troponin T Reference Ranges:  Less than 0.03 ng/mL:    Negative for AMI  0.03 to 0.09 ng/mL:      Indeterminant for AMI  Greater than 0.09 ng/mL: Positive for AMI    CBC Auto Differential [[id number]]  (Abnormal) Collected:  06/15/18 0057    Specimen:  Blood Updated:  06/15/18 0127     WBC 11.65 (H) 10*3/mm3      RBC 3.54 (L) 10*6/mm3      Hemoglobin 10.4 (L) g/dL      Hematocrit 34.9 (L) %      MCV 98.6 (H) fL      MCH 29.4 pg      MCHC 29.8 (L) g/dL      RDW 15.0 (H) %      RDW-SD 54.6 (H) fl      MPV 10.6 fL      Platelets 371 10*3/mm3      Neutrophil % 65.3 %      Lymphocyte % 23.7 %      Monocyte % 9.4 %      Eosinophil % 1.2 %      Basophil % 0.4 %      Immature Grans % 0.2 %      Neutrophils, Absolute 7.60 10*3/mm3      Lymphocytes, Absolute 2.76 10*3/mm3      Monocytes, Absolute 1.10 10*3/mm3      Eosinophils, Absolute 0.14 10*3/mm3      Basophils, Absolute 0.05 10*3/mm3      Immature Grans, Absolute 0.02 10*3/mm3      nRBC 0.0 /100 WBC     Comprehensive Metabolic Panel [[id number]]  (Abnormal) Collected:  06/15/18 0057    Specimen:  Blood Updated:  06/15/18 0238     Glucose 136 (H) mg/dL      BUN 32 (H) mg/dL      Creatinine 1.63 (H) mg/dL      Sodium 140 mmol/L       Potassium 4.7 mmol/L      Chloride 101 mmol/L      CO2 24.5 mmol/L      Calcium 8.7 mg/dL      Total Protein 7.4 g/dL      Albumin 3.10 (L) g/dL      ALT (SGPT) 8 U/L      AST (SGOT) 17 U/L      Alkaline Phosphatase 81 U/L      Total Bilirubin 0.2 mg/dL      eGFR Non African Amer 30 (L) mL/min/1.73      Globulin 4.3 gm/dL      A/G Ratio 0.7 g/dL      BUN/Creatinine Ratio 19.6     Anion Gap 14.5 mmol/L     Narrative:       The MDRD GFR formula is only valid for adults with stable renal function between ages 18 and 70.          I ordered the above labs and reviewed the results    RADIOLOGY  XR Chest 1 View   Final Result       Stable appearance of the chest by portable radiography.           This report was finalized on 6/15/2018 1:34 AM by Ridge Mccord M.D.               I ordered the above noted radiological studies. Interpreted by radiologist.  Reviewed by me in PACS.       PROCEDURES  Procedures  EKG           EKG time: 0010  Rhythm/Rate: Sinus 69  P waves and TN: nml  QRS, axis: LBBB   ST and T waves: nml     Interpreted Contemporaneously by me, independently viewed  LBBB is new compared to prior 01/02/18    PROGRESS AND CONSULTS  ED Course as of Miguel 15 0317   Fri Miguel 15, 2018   0051 12:51 AM  As far as the dark stools go, the patient appears to be taking an iron supplement for chronic anemia.  [WC]   0314 3:14 AM  Patient is hemodynamically stable.  No emesis, no stool in the ED.  Her HGB has actually improved 2 points in the last three months.  She can be safely followed as an outpatient.  [WC]      ED Course User Index  [WC] Chino Lyon MD           MEDICAL DECISION MAKING  Results were reviewed/discussed with the patient and they were also made aware of online access. Pt also made aware that some labs, such as cultures, will not be resulted during ER visit and follow up with PMD is necessary.     MDM  Number of Diagnoses or Management Options  Dementia without behavioral disturbance, unspecified  dementia type:   Dyspnea, unspecified type:      Amount and/or Complexity of Data Reviewed  Clinical lab tests: reviewed and ordered (Glucose 136 (H)   BUN 32 (H)   Creatinine 1.63 (H)   WBC 11.65 (H)   RBC 3.54 (L)   Hemoglobin 10.4 (L)   proBNP 732.0   Troponin is <0.010  )  Tests in the radiology section of CPT®: reviewed and ordered (XR Chest 1 View  Final Result     Stable appearance of the chest by portable radiography.  )  Tests in the medicine section of CPT®: reviewed and ordered (See Procedure Note)  Decide to obtain previous medical records or to obtain history from someone other than the patient: yes  Review and summarize past medical records: yes (Hx limited by dementia  Admitted in march of this year for UTI (ESBL E. Coli))  Independent visualization of images, tracings, or specimens: yes           DIAGNOSIS  Final diagnoses:   Dementia without behavioral disturbance, unspecified dementia type   Dyspnea, unspecified type       DISPOSITION  DISCHARGE    Patient discharged in stable condition.    Reviewed implications of results, diagnosis, meds, responsibility to follow up, warning signs and symptoms of possible worsening, potential complications and reasons to return to ER.    Patient/Family voiced understanding of above instructions.    Discussed plan for discharge, as there is no emergent indication for admission. Patient referred to primary care provider for BP management due to today's BP. Pt/family is agreeable and understands need for follow up and repeat testing.  Pt is aware that discharge does not mean that nothing is wrong but it indicates no emergency is present that requires admission and they must continue care with follow-up as given below or physician of their choice.     FOLLOW-UP  Ani Bentley MD  [address] [id number] [phone]    Schedule an appointment as soon as possible for a visit       [facility] Emergency Department  [address]  [facility] [address]  [phone]    As needed         Medication List      No changes were made to your prescriptions during this visit.           Latest Documented Vital Signs:  As of 3:17 AM  BP- 126/47 HR- 76 Temp- 97.5 °F (36.4 °C) (Tympanic) O2 sat- 91%    --  Documentation assistance provided by alma Boyer for .  Information recorded by the scribe was done at my direction and has been verified and validated by me.          Marcus Boyer  06/15/18 0317       Chino Lyon MD  06/15/18 0620

## 2018-06-15 NOTE — ED NOTES
Per ems- they were called for dark tarry stools and emesis earlier today. Tightness noted to L abdomen; denies pain. EMS reported seeing stool and denies it being dark and tarry. +Audible wheezing; received 1 duoneb.     [facility] report that pt is DNR.     Kallie Borrego RN  06/15/18 0031

## 2018-06-17 ENCOUNTER — HOSPITAL ENCOUNTER (EMERGENCY)
Facility: HOSPITAL | Age: 83
Discharge: HOME | End: 2018-06-17
Attending: EMERGENCY MEDICINE | Admitting: EMERGENCY MEDICINE

## 2018-06-17 VITALS
HEART RATE: 70 BPM | WEIGHT: 126 LBS | TEMPERATURE: 98.1 F | SYSTOLIC BLOOD PRESSURE: 148 MMHG | RESPIRATION RATE: 16 BRPM | BODY MASS INDEX: 20.99 KG/M2 | DIASTOLIC BLOOD PRESSURE: 70 MMHG | OXYGEN SATURATION: 96 % | HEIGHT: 65 IN

## 2018-06-17 DIAGNOSIS — R11.2 NON-INTRACTABLE VOMITING WITH NAUSEA, UNSPECIFIED VOMITING TYPE: Primary | ICD-10-CM

## 2018-06-17 LAB
ABO GROUP BLD: NORMAL
ALBUMIN SERPL-MCNC: 3.2 G/DL (ref 3.5–5.2)
ALBUMIN/GLOB SERPL: 0.7 G/DL
ALP SERPL-CCNC: 82 U/L (ref 39–117)
ALT SERPL W P-5'-P-CCNC: 6 U/L (ref 1–33)
ANION GAP SERPL CALCULATED.3IONS-SCNC: 9.2 MMOL/L
ANTI-E: NORMAL
ANTI-LITTLE C: NORMAL
ANTI-S: NORMAL
ANTIBODY TO LOW FREQUENCY ANTIGEN: NORMAL
APTT PPP: 38.5 SECONDS (ref 22.7–35.4)
AST SERPL-CCNC: 15 U/L (ref 1–32)
BASOPHILS # BLD AUTO: 0.03 10*3/MM3 (ref 0–0.2)
BASOPHILS NFR BLD AUTO: 0.3 % (ref 0–1.5)
BIG S ANTIGEN: NEGATIVE
BILIRUB SERPL-MCNC: 0.3 MG/DL (ref 0.1–1.2)
BLD GP AB SCN SERPL QL: POSITIVE
BUN BLD-MCNC: 38 MG/DL (ref 8–23)
BUN/CREAT SERPL: 21 (ref 7–25)
CALCIUM SPEC-SCNC: 9.3 MG/DL (ref 8.2–9.6)
CHLORIDE SERPL-SCNC: 95 MMOL/L (ref 98–107)
CO2 SERPL-SCNC: 27.8 MMOL/L (ref 22–29)
CREAT BLD-MCNC: 1.81 MG/DL (ref 0.57–1)
DEPRECATED RDW RBC AUTO: 52 FL (ref 37–54)
E AG RBC QL: NEGATIVE
EOSINOPHIL # BLD AUTO: 0.14 10*3/MM3 (ref 0–0.7)
EOSINOPHIL NFR BLD AUTO: 1.3 % (ref 0.3–6.2)
ERYTHROCYTE [DISTWIDTH] IN BLOOD BY AUTOMATED COUNT: 14.5 % (ref 11.7–13)
GFR SERPL CREATININE-BSD FRML MDRD: 26 ML/MIN/1.73
GLOBULIN UR ELPH-MCNC: 4.5 GM/DL
GLUCOSE BLD-MCNC: 151 MG/DL (ref 65–99)
HCT VFR BLD AUTO: 34.2 % (ref 35.6–45.5)
HGB BLD-MCNC: 10.3 G/DL (ref 11.9–15.5)
IMM GRANULOCYTES # BLD: 0.02 10*3/MM3 (ref 0–0.03)
IMM GRANULOCYTES NFR BLD: 0.2 % (ref 0–0.5)
INR PPP: 1.18 (ref 0.9–1.1)
LITTLE C: NEGATIVE
LYMPHOCYTES # BLD AUTO: 1.56 10*3/MM3 (ref 0.9–4.8)
LYMPHOCYTES NFR BLD AUTO: 14.7 % (ref 19.6–45.3)
MCH RBC QN AUTO: 29.4 PG (ref 26.9–32)
MCHC RBC AUTO-ENTMCNC: 30.1 G/DL (ref 32.4–36.3)
MCV RBC AUTO: 97.7 FL (ref 80.5–98.2)
MONOCYTES # BLD AUTO: 0.96 10*3/MM3 (ref 0.2–1.2)
MONOCYTES NFR BLD AUTO: 9.1 % (ref 5–12)
NEUTROPHILS # BLD AUTO: 7.88 10*3/MM3 (ref 1.9–8.1)
NEUTROPHILS NFR BLD AUTO: 74.4 % (ref 42.7–76)
PLATELET # BLD AUTO: 355 10*3/MM3 (ref 140–500)
PMV BLD AUTO: 10.2 FL (ref 6–12)
POTASSIUM BLD-SCNC: 4.3 MMOL/L (ref 3.5–5.2)
PROT SERPL-MCNC: 7.7 G/DL (ref 6–8.5)
PROTHROMBIN TIME: 14.8 SECONDS (ref 11.7–14.2)
RBC # BLD AUTO: 3.5 10*6/MM3 (ref 3.9–5.2)
RH BLD: POSITIVE
SODIUM BLD-SCNC: 132 MMOL/L (ref 136–145)
T&S EXPIRATION DATE: NORMAL
WBC NRBC COR # BLD: 10.59 10*3/MM3 (ref 4.5–10.7)

## 2018-06-17 PROCEDURE — 86870 RBC ANTIBODY IDENTIFICATION: CPT | Performed by: EMERGENCY MEDICINE

## 2018-06-17 PROCEDURE — 96374 THER/PROPH/DIAG INJ IV PUSH: CPT

## 2018-06-17 PROCEDURE — 86870 RBC ANTIBODY IDENTIFICATION: CPT

## 2018-06-17 PROCEDURE — 85025 COMPLETE CBC W/AUTO DIFF WBC: CPT | Performed by: EMERGENCY MEDICINE

## 2018-06-17 PROCEDURE — 86905 BLOOD TYPING RBC ANTIGENS: CPT | Performed by: EMERGENCY MEDICINE

## 2018-06-17 PROCEDURE — 86901 BLOOD TYPING SEROLOGIC RH(D): CPT | Performed by: EMERGENCY MEDICINE

## 2018-06-17 PROCEDURE — 86850 RBC ANTIBODY SCREEN: CPT | Performed by: EMERGENCY MEDICINE

## 2018-06-17 PROCEDURE — 85610 PROTHROMBIN TIME: CPT | Performed by: EMERGENCY MEDICINE

## 2018-06-17 PROCEDURE — 85730 THROMBOPLASTIN TIME PARTIAL: CPT | Performed by: EMERGENCY MEDICINE

## 2018-06-17 PROCEDURE — 80053 COMPREHEN METABOLIC PANEL: CPT | Performed by: EMERGENCY MEDICINE

## 2018-06-17 PROCEDURE — 99283 EMERGENCY DEPT VISIT LOW MDM: CPT

## 2018-06-17 PROCEDURE — 25010000002 ONDANSETRON PER 1 MG: Performed by: EMERGENCY MEDICINE

## 2018-06-17 PROCEDURE — 86900 BLOOD TYPING SEROLOGIC ABO: CPT | Performed by: EMERGENCY MEDICINE

## 2018-06-17 RX ORDER — SODIUM CHLORIDE 0.9 % (FLUSH) 0.9 %
10 SYRINGE (ML) INJECTION AS NEEDED
Status: DISCONTINUED | OUTPATIENT
Start: 2018-06-17 | End: 2018-06-17 | Stop reason: HOSPADM

## 2018-06-17 RX ORDER — ONDANSETRON 2 MG/ML
4 INJECTION INTRAMUSCULAR; INTRAVENOUS ONCE
Status: COMPLETED | OUTPATIENT
Start: 2018-06-17 | End: 2018-06-17

## 2018-06-17 RX ADMIN — ONDANSETRON 4 MG: 2 INJECTION INTRAMUSCULAR; INTRAVENOUS at 06:50

## 2018-06-17 NOTE — ED PROVIDER NOTES
EMERGENCY DEPARTMENT ENCOUNTER    CHIEF COMPLAINT  Chief Complaint: Vomiting   History given by: NH report, EMS  History limited by: dementia  Room Number: 19/19  PMD: Ani Bentley MD      HPI:  Pt is a 89 y.o. female who presents for evaluation of a single episode of vomiting tonight. NH described the emesis as coffee grounds, but EMS described the emesis as dark brown in color ith some blood tinged areas. Pt was seen 2 days ago for similar. Remainder of HPI is limited by dementia.     Duration:  Prior to arrival  Onset: unknown  Timing: single episode   Location: GI  Radiation: n/a  Quality: vomiting   Intensity/Severity: moderate  Associated Symptoms: unknown  Aggravating Factors: unknown  Alleviating Factors: unknown  Previous Episodes: seen here 2 days ago for similar sx   Treatment before arrival: none    PAST MEDICAL HISTORY  Active Ambulatory Problems     Diagnosis Date Noted   • Iron deficiency anemia due to chronic blood loss 06/10/2016   • CKD (chronic kidney disease) 06/10/2016   • Rectal ulcer 06/10/2016   • Anemia of chronic renal failure, stage 3 (moderate) 07/21/2016   • CKD (chronic kidney disease), stage III 03/02/2018   • Chronic diarrhea 03/02/2018   • Anemia due to multiple mechanisms 03/02/2018   • Macrocytic anemia 03/02/2018   • Dementia 03/02/2018   • C. difficile colitis 03/03/2018   • Folate deficiency anemia 03/04/2018     Resolved Ambulatory Problems     Diagnosis Date Noted   • Urinary tract infection in elderly patient 03/02/2018   • Dehydration 03/02/2018     Past Medical History:   Diagnosis Date   • Anemia    • Anxiety    • Chronic kidney disease    • Coronary artery disease    • Dementia    • GERD (gastroesophageal reflux disease)    • Hypertension    • Renal disorder        PAST SURGICAL HISTORY  History reviewed. No pertinent surgical history.    FAMILY HISTORY  History reviewed. No pertinent family history.    SOCIAL HISTORY  Social History     Social History   •  Marital status:      Spouse name: N/A   • Number of children: N/A   • Years of education: N/A     Occupational History   • Not on file.     Social History Main Topics   • Smoking status: Never Smoker   • Smokeless tobacco: Never Used   • Alcohol use No   • Drug use: No   • Sexual activity: Not on file     Other Topics Concern   • Not on file     Social History Narrative   • No narrative on file       ALLERGIES  Niacin    REVIEW OF SYSTEMS  Review of Systems   Unable to perform ROS: Dementia       PHYSICAL EXAM  ED Triage Vitals   Temp Heart Rate Resp BP SpO2   06/17/18 0451 06/17/18 0449 06/17/18 0449 06/17/18 0449 06/17/18 0449   97.6 °F (36.4 °C) 64 16 125/47 92 %      Temp src Heart Rate Source Patient Position BP Location FiO2 (%)   06/17/18 0451 06/17/18 0449 -- -- --   Temporal Art Monitor          Physical Exam   Constitutional: She is well-developed, well-nourished, and in no distress. No distress.   HENT:   Head: Normocephalic and atraumatic.   Eyes: EOM are normal. Pupils are equal, round, and reactive to light.   Neck: Normal range of motion. Neck supple.   Cardiovascular: Normal rate, regular rhythm and normal heart sounds.    Pulmonary/Chest: Effort normal and breath sounds normal. No respiratory distress.   Abdominal: Soft. There is no tenderness. There is no rebound and no guarding.   Musculoskeletal: Normal range of motion. She exhibits no edema.   Neurological: She is alert.   Skin: Skin is warm and dry. No rash noted.   Nursing note and vitals reviewed.      LAB RESULTS  Lab Results (last 24 hours)     Procedure Component Value Units Date/Time    CBC & Differential [[id number]] Collected:  06/17/18 0508    Specimen:  Blood Updated:  06/17/18 0528    Narrative:       The following orders were created for panel order CBC & Differential.  Procedure                               Abnormality         Status                     ---------                               -----------         ------                      CBC Auto Differential[[id number]]        Abnormal            Final result                 Please view results for these tests on the individual orders.    Comprehensive Metabolic Panel [[id number]]  (Abnormal) Collected:  06/17/18 0508    Specimen:  Blood Updated:  06/17/18 0540     Glucose 151 (H) mg/dL      BUN 38 (H) mg/dL      Creatinine 1.81 (H) mg/dL      Sodium 132 (L) mmol/L      Potassium 4.3 mmol/L      Chloride 95 (L) mmol/L      CO2 27.8 mmol/L      Calcium 9.3 mg/dL      Total Protein 7.7 g/dL      Albumin 3.20 (L) g/dL      ALT (SGPT) 6 U/L      AST (SGOT) 15 U/L      Alkaline Phosphatase 82 U/L      Total Bilirubin 0.3 mg/dL      eGFR Non African Amer 26 (L) mL/min/1.73      Globulin 4.5 gm/dL      A/G Ratio 0.7 g/dL      BUN/Creatinine Ratio 21.0     Anion Gap 9.2 mmol/L     Narrative:       The MDRD GFR formula is only valid for adults with stable renal function between ages 18 and 70.    CBC Auto Differential [[id number]]  (Abnormal) Collected:  06/17/18 0508    Specimen:  Blood Updated:  06/17/18 0528     WBC 10.59 10*3/mm3      RBC 3.50 (L) 10*6/mm3      Hemoglobin 10.3 (L) g/dL      Hematocrit 34.2 (L) %      MCV 97.7 fL      MCH 29.4 pg      MCHC 30.1 (L) g/dL      RDW 14.5 (H) %      RDW-SD 52.0 fl      MPV 10.2 fL      Platelets 355 10*3/mm3      Neutrophil % 74.4 %      Lymphocyte % 14.7 (L) %      Monocyte % 9.1 %      Eosinophil % 1.3 %      Basophil % 0.3 %      Immature Grans % 0.2 %      Neutrophils, Absolute 7.88 10*3/mm3      Lymphocytes, Absolute 1.56 10*3/mm3      Monocytes, Absolute 0.96 10*3/mm3      Eosinophils, Absolute 0.14 10*3/mm3      Basophils, Absolute 0.03 10*3/mm3      Immature Grans, Absolute 0.02 10*3/mm3     Protime-INR [[id number]]  (Abnormal) Collected:  06/17/18 0509    Specimen:  Blood Updated:  06/17/18 0540     Protime 14.8 (H) Seconds      INR 1.18 (H)    aPTT [[id number]]  (Abnormal) Collected:  06/17/18 0509    Specimen:  Blood Updated:  06/17/18  0540     PTT 38.5 (H) seconds           I ordered the above labs and reviewed the results      PROCEDURES  Procedures      PROGRESS AND CONSULTS        04:53  CMP, CBC, PT/INR, APTT, and type and screen ordered.     05;08  BP- 125/47 HR- 64 Temp- 97.6 °F (36.4 °C) (Temporal Artery ) O2 sat- 92%  Plan for labs.     06;23  BP- 131/54 HR- 67 Temp- 97.6 °F (36.4 °C) (Temporal Artery ) O2 sat- 94%  Rechecked the patient who is in NAD and is resting comfortably. Pt is alert and has remained without vomiting in the ED. Labs are stable. Pt will be discharged. Pt understands and agrees with the plan, all questions answered.    MEDICAL DECISION MAKING  Results were reviewed/discussed with the patient and they were also made aware of online access. Pt also made aware that some labs, such as cultures, will not be resulted during ER visit and follow up with PMD is necessary.     MDM  Number of Diagnoses or Management Options     Amount and/or Complexity of Data Reviewed  Clinical lab tests: ordered and reviewed (Hgb=10.3)  Decide to obtain previous medical records or to obtain history from someone other than the patient: yes  Obtain history from someone other than the patient: yes (EMS, NH report)  Review and summarize past medical records: yes (Hgb 2 days ago was 10.4. Pt was seen in the ED at that time s/t SOA.)    Patient Progress  Patient progress: stable         DIAGNOSIS  Final diagnoses:   Non-intractable vomiting with nausea, unspecified vomiting type     DISPOSITION  DISCHARGE    Patient discharged in stable condition.    Reviewed implications of results, diagnosis, meds, responsibility to follow up, warning signs and symptoms of possible worsening, potential complications and reasons to return to ER.    Patient/Family voiced understanding of above instructions.    Discussed plan for discharge, as there is no emergent indication for admission. Patient referred to primary care provider for BP management due to today's  BP. Pt/family is agreeable and understands need for follow up and repeat testing.  Pt is aware that discharge does not mean that nothing is wrong but it indicates no emergency is present that requires admission and they must continue care with follow-up as given below or physician of their choice.     FOLLOW-UP  Ani Bentley MD  [address] [id number]  [phone]    Schedule an appointment as soon as possible for a visit            Medication List      No changes were made to your prescriptions during this visit.       Latest Documented Vital Signs:  As of 6:35 AM  BP- 131/54 HR- 67 Temp- 97.6 °F (36.4 °C) (Temporal Artery ) O2 sat- 94%    --  Documentation assistance provided by alma Brown for Dr Handy.  Information recorded by the scribe was done at my direction and has been verified and validated by me.     Maria Luisa Brown  06/17/18 0627       Mika Handy MD  06/17/18 0652

## 2018-06-17 NOTE — ED NOTES
PT vomited x 1, brown and clear emesis. No blood or coffee ground appearance noted. MD notified, order received.     Lilly Carver RN  06/17/18 3355

## 2018-06-17 NOTE — ED TRIAGE NOTES
Pt to triage via EMS stretcher for c/o coffee ground emesis, per NH staff.  EMS reports some blood tinged emesis, but not coffee ground.  Pt seen 2 days ago for same.  Pt appears stable.  First look completed.

## 2018-06-23 ENCOUNTER — APPOINTMENT (OUTPATIENT)
Dept: CT IMAGING | Facility: HOSPITAL | Age: 83
End: 2018-06-23

## 2018-06-23 ENCOUNTER — HOSPITAL ENCOUNTER (INPATIENT)
Facility: HOSPITAL | Age: 83
LOS: 2 days | Discharge: NURSING, REHAB OR LONG-TERM CARE | End: 2018-06-25
Attending: EMERGENCY MEDICINE | Admitting: INTERNAL MEDICINE

## 2018-06-23 ENCOUNTER — APPOINTMENT (OUTPATIENT)
Dept: GENERAL RADIOLOGY | Facility: HOSPITAL | Age: 83
End: 2018-06-23

## 2018-06-23 DIAGNOSIS — R09.02 HYPOXIA: ICD-10-CM

## 2018-06-23 DIAGNOSIS — R40.0 SOMNOLENCE: Primary | ICD-10-CM

## 2018-06-23 DIAGNOSIS — N18.9 CHRONIC RENAL IMPAIRMENT, UNSPECIFIED CKD STAGE: ICD-10-CM

## 2018-06-23 DIAGNOSIS — R41.82 ALTERED MENTAL STATUS, UNSPECIFIED ALTERED MENTAL STATUS TYPE: ICD-10-CM

## 2018-06-23 DIAGNOSIS — G93.40 ENCEPHALOPATHY: ICD-10-CM

## 2018-06-23 DIAGNOSIS — R77.8 ELEVATED TROPONIN: ICD-10-CM

## 2018-06-23 LAB
ALBUMIN SERPL-MCNC: 2.6 G/DL (ref 3.5–5.2)
ALBUMIN/GLOB SERPL: 0.7 G/DL
ALP SERPL-CCNC: 75 U/L (ref 39–117)
ALT SERPL W P-5'-P-CCNC: 8 U/L (ref 1–33)
AMORPH URATE CRY URNS QL MICRO: ABNORMAL /HPF
ANION GAP SERPL CALCULATED.3IONS-SCNC: 12.9 MMOL/L
ARTERIAL PATENCY WRIST A: POSITIVE
AST SERPL-CCNC: 14 U/L (ref 1–32)
ATMOSPHERIC PRESS: 744.5 MMHG
B PERT DNA SPEC QL NAA+PROBE: NOT DETECTED
BACTERIA UR QL AUTO: ABNORMAL /HPF
BASE EXCESS BLDA CALC-SCNC: -2.8 MMOL/L (ref 0–2)
BASOPHILS # BLD AUTO: 0.02 10*3/MM3 (ref 0–0.2)
BASOPHILS NFR BLD AUTO: 0.3 % (ref 0–1.5)
BDY SITE: ABNORMAL
BILIRUB SERPL-MCNC: <0.2 MG/DL (ref 0.1–1.2)
BILIRUB UR QL STRIP: NEGATIVE
BUN BLD-MCNC: 41 MG/DL (ref 8–23)
BUN/CREAT SERPL: 26.5 (ref 7–25)
C PNEUM DNA NPH QL NAA+NON-PROBE: NOT DETECTED
CALCIUM SPEC-SCNC: 8.6 MG/DL (ref 8.2–9.6)
CHLORIDE SERPL-SCNC: 103 MMOL/L (ref 98–107)
CLARITY UR: ABNORMAL
CO2 SERPL-SCNC: 24.1 MMOL/L (ref 22–29)
COLOR UR: YELLOW
CREAT BLD-MCNC: 1.55 MG/DL (ref 0.57–1)
D-LACTATE SERPL-SCNC: 1.8 MMOL/L (ref 0.5–2)
DEPRECATED RDW RBC AUTO: 53.1 FL (ref 37–54)
EOSINOPHIL # BLD AUTO: 0.22 10*3/MM3 (ref 0–0.7)
EOSINOPHIL NFR BLD AUTO: 2.8 % (ref 0.3–6.2)
ERYTHROCYTE [DISTWIDTH] IN BLOOD BY AUTOMATED COUNT: 15.2 % (ref 11.7–13)
FLUAV H1 2009 PAND RNA NPH QL NAA+PROBE: NOT DETECTED
FLUAV H1 HA GENE NPH QL NAA+PROBE: NOT DETECTED
FLUAV H3 RNA NPH QL NAA+PROBE: NOT DETECTED
FLUAV SUBTYP SPEC NAA+PROBE: NOT DETECTED
FLUBV RNA ISLT QL NAA+PROBE: NOT DETECTED
GFR SERPL CREATININE-BSD FRML MDRD: 31 ML/MIN/1.73
GLOBULIN UR ELPH-MCNC: 3.9 GM/DL
GLUCOSE BLD-MCNC: 177 MG/DL (ref 65–99)
GLUCOSE BLDC GLUCOMTR-MCNC: 226 MG/DL (ref 70–130)
GLUCOSE UR STRIP-MCNC: NEGATIVE MG/DL
HADV DNA SPEC NAA+PROBE: NOT DETECTED
HBA1C MFR BLD: 5 % (ref 4.8–5.6)
HCO3 BLDA-SCNC: 22.9 MMOL/L (ref 22–28)
HCOV 229E RNA SPEC QL NAA+PROBE: NOT DETECTED
HCOV HKU1 RNA SPEC QL NAA+PROBE: NOT DETECTED
HCOV NL63 RNA SPEC QL NAA+PROBE: NOT DETECTED
HCOV OC43 RNA SPEC QL NAA+PROBE: NOT DETECTED
HCT VFR BLD AUTO: 31.4 % (ref 35.6–45.5)
HGB BLD-MCNC: 9.6 G/DL (ref 11.9–15.5)
HGB UR QL STRIP.AUTO: ABNORMAL
HMPV RNA NPH QL NAA+NON-PROBE: NOT DETECTED
HPIV1 RNA SPEC QL NAA+PROBE: NOT DETECTED
HPIV2 RNA SPEC QL NAA+PROBE: NOT DETECTED
HPIV3 RNA NPH QL NAA+PROBE: NOT DETECTED
HPIV4 P GENE NPH QL NAA+PROBE: NOT DETECTED
HYALINE CASTS UR QL AUTO: ABNORMAL /LPF
IMM GRANULOCYTES # BLD: 0.03 10*3/MM3 (ref 0–0.03)
IMM GRANULOCYTES NFR BLD: 0.4 % (ref 0–0.5)
INR PPP: 1.23 (ref 0.9–1.1)
KETONES UR QL STRIP: NEGATIVE
LEUKOCYTE ESTERASE UR QL STRIP.AUTO: NEGATIVE
LIPASE SERPL-CCNC: 15 U/L (ref 13–60)
LYMPHOCYTES # BLD AUTO: 1.05 10*3/MM3 (ref 0.9–4.8)
LYMPHOCYTES NFR BLD AUTO: 13.2 % (ref 19.6–45.3)
M PNEUMO IGG SER IA-ACNC: NOT DETECTED
MAGNESIUM SERPL-MCNC: 1.9 MG/DL (ref 1.7–2.3)
MCH RBC QN AUTO: 29.2 PG (ref 26.9–32)
MCHC RBC AUTO-ENTMCNC: 30.6 G/DL (ref 32.4–36.3)
MCV RBC AUTO: 95.4 FL (ref 80.5–98.2)
MODALITY: ABNORMAL
MONOCYTES # BLD AUTO: 1 10*3/MM3 (ref 0.2–1.2)
MONOCYTES NFR BLD AUTO: 12.5 % (ref 5–12)
NEUTROPHILS # BLD AUTO: 5.69 10*3/MM3 (ref 1.9–8.1)
NEUTROPHILS NFR BLD AUTO: 71.2 % (ref 42.7–76)
NITRITE UR QL STRIP: NEGATIVE
NT-PROBNP SERPL-MCNC: 1031 PG/ML (ref 0–1800)
PCO2 BLDA: 42.7 MM HG (ref 35–45)
PH BLDA: 7.34 PH UNITS (ref 7.35–7.45)
PH UR STRIP.AUTO: <=5 [PH] (ref 5–8)
PLATELET # BLD AUTO: 417 10*3/MM3 (ref 140–500)
PMV BLD AUTO: 9.8 FL (ref 6–12)
PO2 BLDA: 67.3 MM HG (ref 80–100)
POTASSIUM BLD-SCNC: 4.6 MMOL/L (ref 3.5–5.2)
PROCALCITONIN SERPL-MCNC: 0.13 NG/ML (ref 0.1–0.25)
PROT SERPL-MCNC: 6.5 G/DL (ref 6–8.5)
PROT UR QL STRIP: ABNORMAL
PROTHROMBIN TIME: 15.3 SECONDS (ref 11.7–14.2)
RBC # BLD AUTO: 3.29 10*6/MM3 (ref 3.9–5.2)
RBC # UR: ABNORMAL /HPF
REF LAB TEST METHOD: ABNORMAL
RHINOVIRUS RNA SPEC NAA+PROBE: NOT DETECTED
RSV RNA NPH QL NAA+NON-PROBE: NOT DETECTED
SAO2 % BLDCOA: 91.8 % (ref 92–99)
SODIUM BLD-SCNC: 140 MMOL/L (ref 136–145)
SP GR UR STRIP: 1.02 (ref 1–1.03)
SQUAMOUS #/AREA URNS HPF: ABNORMAL /HPF
TOTAL RATE: 22 BREATHS/MINUTE
TROPONIN T SERPL-MCNC: 0.02 NG/ML (ref 0–0.03)
TROPONIN T SERPL-MCNC: 0.04 NG/ML (ref 0–0.03)
UROBILINOGEN UR QL STRIP: ABNORMAL
WBC NRBC COR # BLD: 7.98 10*3/MM3 (ref 4.5–10.7)
WBC UR QL AUTO: ABNORMAL /HPF

## 2018-06-23 PROCEDURE — 25010000002 VANCOMYCIN 750 MG RECONSTITUTED SOLUTION: Performed by: INTERNAL MEDICINE

## 2018-06-23 PROCEDURE — 99285 EMERGENCY DEPT VISIT HI MDM: CPT

## 2018-06-23 PROCEDURE — 93005 ELECTROCARDIOGRAM TRACING: CPT | Performed by: EMERGENCY MEDICINE

## 2018-06-23 PROCEDURE — 87798 DETECT AGENT NOS DNA AMP: CPT | Performed by: INTERNAL MEDICINE

## 2018-06-23 PROCEDURE — 84145 PROCALCITONIN (PCT): CPT | Performed by: INTERNAL MEDICINE

## 2018-06-23 PROCEDURE — 71045 X-RAY EXAM CHEST 1 VIEW: CPT

## 2018-06-23 PROCEDURE — 94799 UNLISTED PULMONARY SVC/PX: CPT

## 2018-06-23 PROCEDURE — 85610 PROTHROMBIN TIME: CPT | Performed by: EMERGENCY MEDICINE

## 2018-06-23 PROCEDURE — 87633 RESP VIRUS 12-25 TARGETS: CPT | Performed by: INTERNAL MEDICINE

## 2018-06-23 PROCEDURE — 83690 ASSAY OF LIPASE: CPT | Performed by: EMERGENCY MEDICINE

## 2018-06-23 PROCEDURE — 85025 COMPLETE CBC W/AUTO DIFF WBC: CPT | Performed by: EMERGENCY MEDICINE

## 2018-06-23 PROCEDURE — 82962 GLUCOSE BLOOD TEST: CPT

## 2018-06-23 PROCEDURE — 80053 COMPREHEN METABOLIC PANEL: CPT | Performed by: EMERGENCY MEDICINE

## 2018-06-23 PROCEDURE — 25010000002 ENOXAPARIN PER 10 MG: Performed by: INTERNAL MEDICINE

## 2018-06-23 PROCEDURE — 87040 BLOOD CULTURE FOR BACTERIA: CPT | Performed by: EMERGENCY MEDICINE

## 2018-06-23 PROCEDURE — 93010 ELECTROCARDIOGRAM REPORT: CPT | Performed by: INTERNAL MEDICINE

## 2018-06-23 PROCEDURE — 93005 ELECTROCARDIOGRAM TRACING: CPT | Performed by: INTERNAL MEDICINE

## 2018-06-23 PROCEDURE — 94640 AIRWAY INHALATION TREATMENT: CPT

## 2018-06-23 PROCEDURE — 84484 ASSAY OF TROPONIN QUANT: CPT | Performed by: EMERGENCY MEDICINE

## 2018-06-23 PROCEDURE — 81001 URINALYSIS AUTO W/SCOPE: CPT | Performed by: EMERGENCY MEDICINE

## 2018-06-23 PROCEDURE — 83605 ASSAY OF LACTIC ACID: CPT | Performed by: EMERGENCY MEDICINE

## 2018-06-23 PROCEDURE — 25010000002 PIPERACILLIN SOD-TAZOBACTAM PER 1 G: Performed by: INTERNAL MEDICINE

## 2018-06-23 PROCEDURE — 83735 ASSAY OF MAGNESIUM: CPT | Performed by: EMERGENCY MEDICINE

## 2018-06-23 PROCEDURE — 84484 ASSAY OF TROPONIN QUANT: CPT | Performed by: INTERNAL MEDICINE

## 2018-06-23 PROCEDURE — 70450 CT HEAD/BRAIN W/O DYE: CPT

## 2018-06-23 PROCEDURE — 36600 WITHDRAWAL OF ARTERIAL BLOOD: CPT

## 2018-06-23 PROCEDURE — 87486 CHLMYD PNEUM DNA AMP PROBE: CPT | Performed by: INTERNAL MEDICINE

## 2018-06-23 PROCEDURE — 83036 HEMOGLOBIN GLYCOSYLATED A1C: CPT | Performed by: INTERNAL MEDICINE

## 2018-06-23 PROCEDURE — 82803 BLOOD GASES ANY COMBINATION: CPT

## 2018-06-23 PROCEDURE — 87581 M.PNEUMON DNA AMP PROBE: CPT | Performed by: INTERNAL MEDICINE

## 2018-06-23 PROCEDURE — 83880 ASSAY OF NATRIURETIC PEPTIDE: CPT | Performed by: EMERGENCY MEDICINE

## 2018-06-23 PROCEDURE — 71250 CT THORAX DX C-: CPT

## 2018-06-23 RX ORDER — SACCHAROMYCES BOULARDII 250 MG
250 CAPSULE ORAL 2 TIMES DAILY
COMMUNITY
Start: 2018-06-18 | End: 2018-07-02

## 2018-06-23 RX ORDER — DIPHENOXYLATE HYDROCHLORIDE AND ATROPINE SULFATE 2.5; .025 MG/1; MG/1
2 TABLET ORAL
COMMUNITY
Start: 2018-05-04

## 2018-06-23 RX ORDER — IPRATROPIUM BROMIDE AND ALBUTEROL SULFATE 2.5; .5 MG/3ML; MG/3ML
3 SOLUTION RESPIRATORY (INHALATION) ONCE
Status: COMPLETED | OUTPATIENT
Start: 2018-06-23 | End: 2018-06-23

## 2018-06-23 RX ORDER — ACETAMINOPHEN 325 MG/1
650 TABLET ORAL EVERY 6 HOURS PRN
Status: DISCONTINUED | OUTPATIENT
Start: 2018-06-23 | End: 2018-06-25 | Stop reason: HOSPADM

## 2018-06-23 RX ORDER — SODIUM CHLORIDE, SODIUM LACTATE, POTASSIUM CHLORIDE, CALCIUM CHLORIDE 600; 310; 30; 20 MG/100ML; MG/100ML; MG/100ML; MG/100ML
50 INJECTION, SOLUTION INTRAVENOUS CONTINUOUS
Status: DISCONTINUED | OUTPATIENT
Start: 2018-06-23 | End: 2018-06-25 | Stop reason: HOSPADM

## 2018-06-23 RX ORDER — SODIUM CHLORIDE 0.9 % (FLUSH) 0.9 %
10 SYRINGE (ML) INJECTION AS NEEDED
Status: DISCONTINUED | OUTPATIENT
Start: 2018-06-23 | End: 2018-06-25 | Stop reason: HOSPADM

## 2018-06-23 RX ORDER — SODIUM CHLORIDE 0.9 % (FLUSH) 0.9 %
1-10 SYRINGE (ML) INJECTION AS NEEDED
Status: DISCONTINUED | OUTPATIENT
Start: 2018-06-23 | End: 2018-06-25 | Stop reason: HOSPADM

## 2018-06-23 RX ORDER — SODIUM CHLORIDE 9 MG/ML
125 INJECTION, SOLUTION INTRAVENOUS CONTINUOUS
Status: DISCONTINUED | OUTPATIENT
Start: 2018-06-23 | End: 2018-06-23

## 2018-06-23 RX ORDER — AMPICILLIN AND SULBACTAM 1; .5 G/1; G/1
1.5 INJECTION, POWDER, FOR SOLUTION INTRAMUSCULAR; INTRAVENOUS EVERY 8 HOURS SCHEDULED
Status: DISCONTINUED | OUTPATIENT
Start: 2018-06-23 | End: 2018-06-23

## 2018-06-23 RX ORDER — IPRATROPIUM BROMIDE AND ALBUTEROL SULFATE 2.5; .5 MG/3ML; MG/3ML
3 SOLUTION RESPIRATORY (INHALATION) EVERY 4 HOURS PRN
Status: DISCONTINUED | OUTPATIENT
Start: 2018-06-23 | End: 2018-06-25 | Stop reason: HOSPADM

## 2018-06-23 RX ORDER — ESCITALOPRAM OXALATE 10 MG/1
10 TABLET ORAL DAILY
Status: DISCONTINUED | OUTPATIENT
Start: 2018-06-23 | End: 2018-06-25 | Stop reason: HOSPADM

## 2018-06-23 RX ORDER — FERROUS SULFATE 325(65) MG
325 TABLET ORAL
Status: DISCONTINUED | OUTPATIENT
Start: 2018-06-24 | End: 2018-06-25 | Stop reason: HOSPADM

## 2018-06-23 RX ORDER — FOLIC ACID 1 MG/1
1 TABLET ORAL DAILY
Status: DISCONTINUED | OUTPATIENT
Start: 2018-06-23 | End: 2018-06-25 | Stop reason: HOSPADM

## 2018-06-23 RX ORDER — MIRTAZAPINE 15 MG/1
15 TABLET, FILM COATED ORAL NIGHTLY
Status: DISCONTINUED | OUTPATIENT
Start: 2018-06-23 | End: 2018-06-25 | Stop reason: HOSPADM

## 2018-06-23 RX ORDER — ACETAMINOPHEN 650 MG/1
650 SUPPOSITORY RECTAL ONCE
Status: DISCONTINUED | OUTPATIENT
Start: 2018-06-23 | End: 2018-06-23

## 2018-06-23 RX ORDER — TAMSULOSIN HYDROCHLORIDE 0.4 MG/1
0.4 CAPSULE ORAL NIGHTLY
Status: DISCONTINUED | OUTPATIENT
Start: 2018-06-23 | End: 2018-06-25 | Stop reason: HOSPADM

## 2018-06-23 RX ADMIN — AMPICILLIN AND SULBACTAM 1.5 G: 1; .5 INJECTION, POWDER, FOR SOLUTION INTRAMUSCULAR; INTRAVENOUS at 21:53

## 2018-06-23 RX ADMIN — SODIUM CHLORIDE, POTASSIUM CHLORIDE, SODIUM LACTATE AND CALCIUM CHLORIDE 100 ML/HR: 600; 310; 30; 20 INJECTION, SOLUTION INTRAVENOUS at 13:48

## 2018-06-23 RX ADMIN — SODIUM CHLORIDE 1000 ML: 9 INJECTION, SOLUTION INTRAVENOUS at 13:10

## 2018-06-23 RX ADMIN — SODIUM CHLORIDE 750 MG: 900 INJECTION, SOLUTION INTRAVENOUS at 17:50

## 2018-06-23 RX ADMIN — IPRATROPIUM BROMIDE AND ALBUTEROL SULFATE 3 ML: .5; 3 SOLUTION RESPIRATORY (INHALATION) at 13:13

## 2018-06-23 RX ADMIN — SODIUM CHLORIDE 500 ML: 9 INJECTION, SOLUTION INTRAVENOUS at 11:32

## 2018-06-23 RX ADMIN — TAZOBACTAM SODIUM AND PIPERACILLIN SODIUM 3.38 G: 375; 3 INJECTION, SOLUTION INTRAVENOUS at 17:50

## 2018-06-23 RX ADMIN — SODIUM CHLORIDE 125 ML/HR: 9 INJECTION, SOLUTION INTRAVENOUS at 12:32

## 2018-06-23 RX ADMIN — ENOXAPARIN SODIUM 30 MG: 30 INJECTION SUBCUTANEOUS at 17:49

## 2018-06-24 LAB
ANION GAP SERPL CALCULATED.3IONS-SCNC: 7.8 MMOL/L
BASOPHILS # BLD AUTO: 0.02 10*3/MM3 (ref 0–0.2)
BASOPHILS NFR BLD AUTO: 0.3 % (ref 0–1.5)
BUN BLD-MCNC: 32 MG/DL (ref 8–23)
BUN/CREAT SERPL: 25.2 (ref 7–25)
CALCIUM SPEC-SCNC: 8 MG/DL (ref 8.2–9.6)
CHLORIDE SERPL-SCNC: 108 MMOL/L (ref 98–107)
CO2 SERPL-SCNC: 24.2 MMOL/L (ref 22–29)
CREAT BLD-MCNC: 1.27 MG/DL (ref 0.57–1)
DEPRECATED RDW RBC AUTO: 53.6 FL (ref 37–54)
EOSINOPHIL # BLD AUTO: 0.25 10*3/MM3 (ref 0–0.7)
EOSINOPHIL NFR BLD AUTO: 3.7 % (ref 0.3–6.2)
ERYTHROCYTE [DISTWIDTH] IN BLOOD BY AUTOMATED COUNT: 15.2 % (ref 11.7–13)
GFR SERPL CREATININE-BSD FRML MDRD: 39 ML/MIN/1.73
GLUCOSE BLD-MCNC: 81 MG/DL (ref 65–99)
HCT VFR BLD AUTO: 28.2 % (ref 35.6–45.5)
HGB BLD-MCNC: 8.5 G/DL (ref 11.9–15.5)
IMM GRANULOCYTES # BLD: 0.02 10*3/MM3 (ref 0–0.03)
IMM GRANULOCYTES NFR BLD: 0.3 % (ref 0–0.5)
LYMPHOCYTES # BLD AUTO: 1.6 10*3/MM3 (ref 0.9–4.8)
LYMPHOCYTES NFR BLD AUTO: 23.6 % (ref 19.6–45.3)
MCH RBC QN AUTO: 29.2 PG (ref 26.9–32)
MCHC RBC AUTO-ENTMCNC: 30.1 G/DL (ref 32.4–36.3)
MCV RBC AUTO: 96.9 FL (ref 80.5–98.2)
MONOCYTES # BLD AUTO: 0.91 10*3/MM3 (ref 0.2–1.2)
MONOCYTES NFR BLD AUTO: 13.4 % (ref 5–12)
NEUTROPHILS # BLD AUTO: 3.97 10*3/MM3 (ref 1.9–8.1)
NEUTROPHILS NFR BLD AUTO: 58.7 % (ref 42.7–76)
PLATELET # BLD AUTO: 345 10*3/MM3 (ref 140–500)
PMV BLD AUTO: 9.8 FL (ref 6–12)
POTASSIUM BLD-SCNC: 4.9 MMOL/L (ref 3.5–5.2)
RBC # BLD AUTO: 2.91 10*6/MM3 (ref 3.9–5.2)
SODIUM BLD-SCNC: 140 MMOL/L (ref 136–145)
VANCOMYCIN SERPL-MCNC: 8.8 MCG/ML (ref 5–40)
WBC NRBC COR # BLD: 6.77 10*3/MM3 (ref 4.5–10.7)

## 2018-06-24 PROCEDURE — 87081 CULTURE SCREEN ONLY: CPT | Performed by: INTERNAL MEDICINE

## 2018-06-24 PROCEDURE — 92610 EVALUATE SWALLOWING FUNCTION: CPT

## 2018-06-24 PROCEDURE — 80202 ASSAY OF VANCOMYCIN: CPT | Performed by: INTERNAL MEDICINE

## 2018-06-24 PROCEDURE — 94799 UNLISTED PULMONARY SVC/PX: CPT

## 2018-06-24 PROCEDURE — 85025 COMPLETE CBC W/AUTO DIFF WBC: CPT | Performed by: INTERNAL MEDICINE

## 2018-06-24 PROCEDURE — 80048 BASIC METABOLIC PNL TOTAL CA: CPT | Performed by: INTERNAL MEDICINE

## 2018-06-24 RX ADMIN — IPRATROPIUM BROMIDE AND ALBUTEROL SULFATE 3 ML: .5; 3 SOLUTION RESPIRATORY (INHALATION) at 08:44

## 2018-06-24 RX ADMIN — AMPICILLIN AND SULBACTAM 1.5 G: 1; .5 INJECTION, POWDER, FOR SOLUTION INTRAMUSCULAR; INTRAVENOUS at 22:07

## 2018-06-24 RX ADMIN — SODIUM CHLORIDE, POTASSIUM CHLORIDE, SODIUM LACTATE AND CALCIUM CHLORIDE 50 ML/HR: 600; 310; 30; 20 INJECTION, SOLUTION INTRAVENOUS at 13:06

## 2018-06-24 RX ADMIN — AMPICILLIN AND SULBACTAM 1.5 G: 1; .5 INJECTION, POWDER, FOR SOLUTION INTRAMUSCULAR; INTRAVENOUS at 13:06

## 2018-06-25 VITALS
HEART RATE: 72 BPM | TEMPERATURE: 98.3 F | WEIGHT: 139.99 LBS | OXYGEN SATURATION: 93 % | DIASTOLIC BLOOD PRESSURE: 63 MMHG | RESPIRATION RATE: 20 BRPM | SYSTOLIC BLOOD PRESSURE: 147 MMHG | BODY MASS INDEX: 27.48 KG/M2 | HEIGHT: 60 IN

## 2018-06-25 PROCEDURE — 94799 UNLISTED PULMONARY SVC/PX: CPT

## 2018-06-25 RX ORDER — AMOXICILLIN AND CLAVULANATE POTASSIUM 500; 125 MG/1; MG/1
1 TABLET, FILM COATED ORAL EVERY 12 HOURS SCHEDULED
Qty: 10 TABLET | Refills: 0 | Status: SHIPPED | OUTPATIENT
Start: 2018-06-25 | End: 2018-06-30

## 2018-06-25 RX ORDER — AMOXICILLIN AND CLAVULANATE POTASSIUM 500; 125 MG/1; MG/1
1 TABLET, FILM COATED ORAL EVERY 12 HOURS SCHEDULED
Status: DISCONTINUED | OUTPATIENT
Start: 2018-06-25 | End: 2018-06-25 | Stop reason: HOSPADM

## 2018-06-25 RX ADMIN — SODIUM HYPOCHLORITE 946 ML: 1.25 SOLUTION TOPICAL at 03:15

## 2018-06-25 RX ADMIN — AMOXICILLIN AND CLAVULANATE POTASSIUM 500 MG: 500; 125 TABLET, FILM COATED ORAL at 10:50

## 2018-06-25 RX ADMIN — SODIUM HYPOCHLORITE 946 ML: 1.25 SOLUTION TOPICAL at 10:41

## 2018-06-25 RX ADMIN — IPRATROPIUM BROMIDE AND ALBUTEROL SULFATE 3 ML: .5; 3 SOLUTION RESPIRATORY (INHALATION) at 03:34

## 2018-06-25 RX ADMIN — COLLAGENASE SANTYL: 250 OINTMENT TOPICAL at 03:22

## 2018-06-25 RX ADMIN — ESCITALOPRAM 10 MG: 10 TABLET, FILM COATED ORAL at 10:40

## 2018-06-25 RX ADMIN — AMPICILLIN AND SULBACTAM 1.5 G: 1; .5 INJECTION, POWDER, FOR SOLUTION INTRAMUSCULAR; INTRAVENOUS at 06:01

## 2018-06-25 RX ADMIN — COLLAGENASE SANTYL: 250 OINTMENT TOPICAL at 10:40

## 2018-06-25 RX ADMIN — FOLIC ACID 1 MG: 1 TABLET ORAL at 10:40

## 2018-06-27 LAB — MRSA SPEC QL CULT: NORMAL

## 2018-06-28 LAB
BACTERIA SPEC AEROBE CULT: NORMAL
BACTERIA SPEC AEROBE CULT: NORMAL